# Patient Record
Sex: FEMALE | Race: WHITE | NOT HISPANIC OR LATINO
[De-identification: names, ages, dates, MRNs, and addresses within clinical notes are randomized per-mention and may not be internally consistent; named-entity substitution may affect disease eponyms.]

---

## 2019-02-08 ENCOUNTER — RESULT REVIEW (OUTPATIENT)
Age: 24
End: 2019-02-08

## 2019-02-09 ENCOUNTER — TRANSCRIPTION ENCOUNTER (OUTPATIENT)
Age: 24
End: 2019-02-09

## 2019-10-01 ENCOUNTER — TRANSCRIPTION ENCOUNTER (OUTPATIENT)
Age: 24
End: 2019-10-01

## 2019-10-15 ENCOUNTER — TRANSCRIPTION ENCOUNTER (OUTPATIENT)
Age: 24
End: 2019-10-15

## 2019-10-23 ENCOUNTER — TRANSCRIPTION ENCOUNTER (OUTPATIENT)
Age: 24
End: 2019-10-23

## 2019-10-24 ENCOUNTER — NON-APPOINTMENT (OUTPATIENT)
Age: 24
End: 2019-10-24

## 2019-10-24 ENCOUNTER — APPOINTMENT (OUTPATIENT)
Dept: INTERNAL MEDICINE | Facility: CLINIC | Age: 24
End: 2019-10-24
Payer: COMMERCIAL

## 2019-10-24 VITALS
RESPIRATION RATE: 16 BRPM | BODY MASS INDEX: 23.46 KG/M2 | HEIGHT: 66 IN | HEART RATE: 84 BPM | SYSTOLIC BLOOD PRESSURE: 110 MMHG | DIASTOLIC BLOOD PRESSURE: 76 MMHG | WEIGHT: 146 LBS | OXYGEN SATURATION: 98 %

## 2019-10-24 DIAGNOSIS — J45.901 UNSPECIFIED ASTHMA WITH (ACUTE) EXACERBATION: ICD-10-CM

## 2019-10-24 DIAGNOSIS — Z00.00 ENCOUNTER FOR GENERAL ADULT MEDICAL EXAMINATION W/OUT ABNORMAL FINDINGS: ICD-10-CM

## 2019-10-24 LAB
BILIRUB UR QL STRIP: NEGATIVE
CLARITY UR: CLEAR
COLLECTION METHOD: NORMAL
GLUCOSE UR-MCNC: NEGATIVE
HCG UR QL: 0.2 EU/DL
HGB UR QL STRIP.AUTO: NEGATIVE
KETONES UR-MCNC: NEGATIVE
LEUKOCYTE ESTERASE UR QL STRIP: NEGATIVE
NITRITE UR QL STRIP: NEGATIVE
PH UR STRIP: 7
PROT UR STRIP-MCNC: NEGATIVE
SP GR UR STRIP: 1.01

## 2019-10-24 PROCEDURE — 94010 BREATHING CAPACITY TEST: CPT

## 2019-10-24 PROCEDURE — 81003 URINALYSIS AUTO W/O SCOPE: CPT | Mod: QW

## 2019-10-24 PROCEDURE — 36415 COLL VENOUS BLD VENIPUNCTURE: CPT

## 2019-10-24 PROCEDURE — 99385 PREV VISIT NEW AGE 18-39: CPT | Mod: 25

## 2019-10-24 PROCEDURE — 93000 ELECTROCARDIOGRAM COMPLETE: CPT

## 2019-10-24 RX ORDER — ALBUTEROL SULFATE 90 UG/1
108 (90 BASE) AEROSOL, METERED RESPIRATORY (INHALATION)
Refills: 0 | Status: ACTIVE | COMMUNITY
Start: 2019-10-24

## 2019-10-24 RX ORDER — ALBUTEROL SULFATE 2.5 MG/3ML
(2.5 MG/3ML) SOLUTION RESPIRATORY (INHALATION)
Refills: 0 | Status: ACTIVE | COMMUNITY
Start: 2019-10-24

## 2019-10-24 NOTE — HEALTH RISK ASSESSMENT
[Excellent] : ~his/her~  mood as  excellent [0] : 2) Feeling down, depressed, or hopeless: Not at all (0) [With Family] : lives with family [Employed] : employed [College] : College [Sexually Active] : sexually active [de-identified] : appropriate [Change in mental status noted] : No change in mental status noted [High Risk Behavior] : no high risk behavior [Reports changes in hearing] : Reports no changes in hearing [Reports changes in vision] : Reports no changes in vision [de-identified] : JULIENNE Marie  [FreeTextEntry3] : BF x >3 y   ([employer] [facility])

## 2019-10-24 NOTE — HISTORY OF PRESENT ILLNESS
[FreeTextEntry1] : Initial visit\par New physician wanted [de-identified] : Recently   urgent care\par Asthma flare   present 10 y   \par    [facility]    [place]\par Several visit\par Cough\par SOB\par \par Recent 5 d   steroids by mouth   tapered\par Wheeeze\par No fever\par Mucous\par \par Recent PPD (neg) \par Recent Flu shot \par CXR reported neg\par BDtherapy / inhaled steroid / beta 2 blocker / montelukast

## 2019-10-24 NOTE — ASSESSMENT
[FreeTextEntry1] : Asthma\par Stay on current meds\par \par Short OR\par Prob unimportant\par No syncope\par No real worrisome symptoims thou she has had some mild dizziness\par \par Call for BT reports\par \par

## 2019-10-24 NOTE — PHYSICAL EXAM
[No Acute Distress] : no acute distress [Well Nourished] : well nourished [Well Developed] : well developed [Well-Appearing] : well-appearing [Normal Sclera/Conjunctiva] : normal sclera/conjunctiva [PERRL] : pupils equal round and reactive to light [EOMI] : extraocular movements intact [Normal Outer Ear/Nose] : the outer ears and nose were normal in appearance [Normal Oropharynx] : the oropharynx was normal [No JVD] : no jugular venous distention [No Lymphadenopathy] : no lymphadenopathy [Supple] : supple [Thyroid Normal, No Nodules] : the thyroid was normal and there were no nodules present [No Respiratory Distress] : no respiratory distress  [Diffuse Wheezing] : diffuse wheezing was heard [Normal Rate] : normal rate  [Regular Rhythm] : with a regular rhythm [Normal S1, S2] : normal S1 and S2 [No Murmur] : no murmur heard [No Abdominal Bruit] : a ~M bruit was not heard ~T in the abdomen [No Carotid Bruits] : no carotid bruits [No Varicosities] : no varicosities [Pedal Pulses Present] : the pedal pulses are present [No Edema] : there was no peripheral edema [No Palpable Aorta] : no palpable aorta [No Extremity Clubbing/Cyanosis] : no extremity clubbing/cyanosis [Non Tender] : non-tender [Soft] : abdomen soft [Non-distended] : non-distended [No Masses] : no abdominal mass palpated [No HSM] : no HSM [Normal Bowel Sounds] : normal bowel sounds [Normal Posterior Cervical Nodes] : no posterior cervical lymphadenopathy [Normal Anterior Cervical Nodes] : no anterior cervical lymphadenopathy [No Spinal Tenderness] : no spinal tenderness [No CVA Tenderness] : no CVA  tenderness [No Joint Swelling] : no joint swelling [Grossly Normal Strength/Tone] : grossly normal strength/tone [No Rash] : no rash [Coordination Grossly Intact] : coordination grossly intact [No Focal Deficits] : no focal deficits [Normal Gait] : normal gait [Deep Tendon Reflexes (DTR)] : deep tendon reflexes were 2+ and symmetric [Normal Affect] : the affect was normal [Normal Insight/Judgement] : insight and judgment were intact

## 2019-10-25 LAB
25(OH)D3 SERPL-MCNC: 18.6 NG/ML
ALBUMIN SERPL ELPH-MCNC: 4.8 G/DL
ALP BLD-CCNC: 62 U/L
ALT SERPL-CCNC: 18 U/L
ANION GAP SERPL CALC-SCNC: 13 MMOL/L
AST SERPL-CCNC: 21 U/L
BASOPHILS # BLD AUTO: 0.03 K/UL
BASOPHILS NFR BLD AUTO: 0.8 %
BILIRUB SERPL-MCNC: 0.5 MG/DL
BUN SERPL-MCNC: 12 MG/DL
CALCIUM SERPL-MCNC: 9.7 MG/DL
CHLORIDE SERPL-SCNC: 102 MMOL/L
CHOLEST SERPL-MCNC: 213 MG/DL
CHOLEST/HDLC SERPL: 2.9 RATIO
CO2 SERPL-SCNC: 26 MMOL/L
CREAT SERPL-MCNC: 0.88 MG/DL
EOSINOPHIL # BLD AUTO: 0.11 K/UL
EOSINOPHIL NFR BLD AUTO: 2.8 %
ESTIMATED AVERAGE GLUCOSE: 88 MG/DL
GLUCOSE SERPL-MCNC: 76 MG/DL
HBA1C MFR BLD HPLC: 4.7 %
HCT VFR BLD CALC: 43.9 %
HDLC SERPL-MCNC: 73 MG/DL
HGB BLD-MCNC: 14.3 G/DL
IMM GRANULOCYTES NFR BLD AUTO: 0.8 %
LDLC SERPL CALC-MCNC: 111 MG/DL
LYMPHOCYTES # BLD AUTO: 1.15 K/UL
LYMPHOCYTES NFR BLD AUTO: 29.3 %
MAN DIFF?: NORMAL
MCHC RBC-ENTMCNC: 32.3 PG
MCHC RBC-ENTMCNC: 32.6 GM/DL
MCV RBC AUTO: 99.1 FL
MONOCYTES # BLD AUTO: 0.31 K/UL
MONOCYTES NFR BLD AUTO: 7.9 %
NEUTROPHILS # BLD AUTO: 2.29 K/UL
NEUTROPHILS NFR BLD AUTO: 58.4 %
PLATELET # BLD AUTO: 201 K/UL
POTASSIUM SERPL-SCNC: 4.1 MMOL/L
PROT SERPL-MCNC: 6.8 G/DL
RBC # BLD: 4.43 M/UL
RBC # FLD: 12.6 %
SODIUM SERPL-SCNC: 141 MMOL/L
T4 FREE SERPL-MCNC: 1.1 NG/DL
TRIGL SERPL-MCNC: 143 MG/DL
TSH SERPL-ACNC: 1.76 UIU/ML
WBC # FLD AUTO: 3.92 K/UL

## 2019-11-07 ENCOUNTER — TRANSCRIPTION ENCOUNTER (OUTPATIENT)
Age: 24
End: 2019-11-07

## 2019-12-19 ENCOUNTER — APPOINTMENT (OUTPATIENT)
Dept: OPHTHALMOLOGY | Facility: CLINIC | Age: 24
End: 2019-12-19
Payer: COMMERCIAL

## 2019-12-19 ENCOUNTER — NON-APPOINTMENT (OUTPATIENT)
Age: 24
End: 2019-12-19

## 2019-12-19 PROCEDURE — 92004 COMPRE OPH EXAM NEW PT 1/>: CPT

## 2019-12-30 ENCOUNTER — TRANSCRIPTION ENCOUNTER (OUTPATIENT)
Age: 24
End: 2019-12-30

## 2020-04-26 ENCOUNTER — MESSAGE (OUTPATIENT)
Age: 25
End: 2020-04-26

## 2022-09-20 ENCOUNTER — APPOINTMENT (OUTPATIENT)
Dept: ORTHOPEDIC SURGERY | Facility: CLINIC | Age: 27
End: 2022-09-20

## 2023-05-10 ENCOUNTER — APPOINTMENT (OUTPATIENT)
Dept: ANTEPARTUM | Facility: CLINIC | Age: 28
End: 2023-05-10
Payer: COMMERCIAL

## 2023-05-10 ENCOUNTER — APPOINTMENT (OUTPATIENT)
Dept: OBGYN | Facility: CLINIC | Age: 28
End: 2023-05-10
Payer: COMMERCIAL

## 2023-05-10 VITALS — SYSTOLIC BLOOD PRESSURE: 128 MMHG | DIASTOLIC BLOOD PRESSURE: 81 MMHG | WEIGHT: 150 LBS

## 2023-05-10 DIAGNOSIS — Z36.82 ENCOUNTER FOR ANTENATAL SCREENING FOR NUCHAL TRANSLUCENCY: ICD-10-CM

## 2023-05-10 PROCEDURE — 76813 OB US NUCHAL MEAS 1 GEST: CPT

## 2023-05-10 PROCEDURE — 36415 COLL VENOUS BLD VENIPUNCTURE: CPT

## 2023-05-10 PROCEDURE — 76801 OB US < 14 WKS SINGLE FETUS: CPT | Mod: 59

## 2023-05-10 PROCEDURE — 99203 OFFICE O/P NEW LOW 30 MIN: CPT

## 2023-05-10 NOTE — HISTORY OF PRESENT ILLNESS
[FreeTextEntry1] : Patient is a 28 year old presenting at 11w5d seen in my office today for nuchal translucency testing. DOM 11/24/23.The limitations of testing were discussed with the patient and she was informed that this is a screening test. If she desires a diagnostic test like CVS or Amniocentesis, this may be performed.

## 2023-05-10 NOTE — DISCUSSION/SUMMARY
[FreeTextEntry1] : The significance of nuchal translucency testing was explained to the patient and ultrasound was performed. The sensitivity of the test can be improved by combining with second trimester quad screening. This type of sequential testing minimally increases the false positive rate, but the detection rate for Down syndrome is increased. If the sequential testing is desired, a second stage quad should be drawn and sent. As an alternative, this can be done in our office. If the patient does not desire sequential testing, then a single marker for AFP may be sent to any lab after 15 completed weeks gestation.\par \par Prenatal diagnostic testing is clinically indicated for this patient. The limitations of NIPT testing were discussed with the patient and amniocentesis was noted to remain the gold standard for prenatal diagnostic testing. The significance of NIPT testing was reviewed and offered to the patient and she has agreed to testing. Blood was drawn and the results will be sent to your office in approximately 2 weeks. Sequential screening is recommended between 15-16 weeks. Anatomy scan is recommended at 19-20 weeks.

## 2023-05-15 LAB
ADDITIONAL US: NORMAL
CRL SCAN TWIN B: NORMAL
CRL SCAN: NORMAL
CROWN RUMP LENGTH TWIN B: NORMAL
CROWN RUMP LENGTH: 51.6 MM
DIA MOM: 1.52
DIA VALUE: 395.6 PG/ML
DOWN SYNDROME AGE RISK: NORMAL
DOWN SYNDROME INTERPRETATION: NORMAL
DOWN SYNDROME SCREENING RISK: NORMAL
FIRST TRIMESTER SCREEN COMMENTS: NORMAL
FIRST TRIMESTER SCREEN NOTE: NORMAL
FIRST TRIMESTER SCREEN RESULTS: NORMAL
FIRST TRIMESTER SCREEN TEST RESULTS: NORMAL
GEST. AGE ON COLLECTION DATE: 11.7 WEEKS
HCG MOM: 1.11
HCG VALUE: 123.9 IU/ML
MATERNAL AGE AT EDD: 27.8 YR
NT MOM TWIN B: NORMAL
NT TWIN B: NORMAL
NUCHAL TRANSLUCENCY (NT): 1.1 MM
NUCHAL TRANSLUCENCY MOM: 0.81
NUMBER OF FETUSES: 1
PAPP-A MOM: 1.42
PAPP-A VALUE: 1059.1 NG/ML
RACE: NORMAL
SONOGRAPHER ID#: NORMAL
TRISOMY 18 AGE RISK: NORMAL
TRISOMY 18 INTERPRETATION: NORMAL
TRISOMY 18 SCREENING RISK: NORMAL
WEIGHT AFP: 150 LBS

## 2023-06-13 DIAGNOSIS — Z80.3 FAMILY HISTORY OF MALIGNANT NEOPLASM OF BREAST: ICD-10-CM

## 2023-06-13 DIAGNOSIS — Z3A.16 16 WEEKS GESTATION OF PREGNANCY: ICD-10-CM

## 2023-06-13 DIAGNOSIS — R55 SYNCOPE AND COLLAPSE: ICD-10-CM

## 2023-06-13 DIAGNOSIS — Z82.49 FAMILY HISTORY OF ISCHEMIC HEART DISEASE AND OTHER DISEASES OF THE CIRCULATORY SYSTEM: ICD-10-CM

## 2023-06-13 DIAGNOSIS — Z78.9 OTHER SPECIFIED HEALTH STATUS: ICD-10-CM

## 2023-06-13 RX ORDER — PRENATAL VIT NO.130/IRON/FOLIC 27MG-0.8MG
TABLET ORAL
Refills: 0 | Status: ACTIVE | COMMUNITY

## 2023-06-13 RX ORDER — MONTELUKAST SODIUM 10 MG/1
10 TABLET, FILM COATED ORAL
Refills: 0 | Status: DISCONTINUED | COMMUNITY
Start: 2019-10-24 | End: 2023-06-13

## 2023-06-13 RX ORDER — FLUTICASONE PROPIONATE 110 UG/1
110 AEROSOL, METERED RESPIRATORY (INHALATION)
Refills: 0 | Status: DISCONTINUED | COMMUNITY
Start: 2019-10-24 | End: 2023-06-13

## 2023-06-15 ENCOUNTER — APPOINTMENT (OUTPATIENT)
Dept: CARDIOLOGY | Facility: CLINIC | Age: 28
End: 2023-06-15
Payer: COMMERCIAL

## 2023-06-15 ENCOUNTER — NON-APPOINTMENT (OUTPATIENT)
Age: 28
End: 2023-06-15

## 2023-06-15 VITALS
HEIGHT: 66 IN | HEART RATE: 76 BPM | WEIGHT: 152 LBS | SYSTOLIC BLOOD PRESSURE: 120 MMHG | DIASTOLIC BLOOD PRESSURE: 74 MMHG | BODY MASS INDEX: 24.43 KG/M2 | OXYGEN SATURATION: 100 %

## 2023-06-15 DIAGNOSIS — R06.02 SHORTNESS OF BREATH: ICD-10-CM

## 2023-06-15 DIAGNOSIS — R00.2 PALPITATIONS: ICD-10-CM

## 2023-06-15 PROCEDURE — 93000 ELECTROCARDIOGRAM COMPLETE: CPT

## 2023-06-15 PROCEDURE — 99203 OFFICE O/P NEW LOW 30 MIN: CPT | Mod: 25

## 2023-06-15 NOTE — HISTORY OF PRESENT ILLNESS
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 She feels her heart racing; gets flushed and lightheaded, few seconds where she is disoriented.\par Saw outside cardiologist and is currently wearing monitor for 2 weeks\par Echo done at [facility] was normal\par

## 2023-06-15 NOTE — DISCUSSION/SUMMARY
[FreeTextEntry1] : 28 year old  who is 16 weeks pregnant  and comes in for evaluation of palpitations\par -will get monitor results from Dr. Mistry\par -fu thereafter [EKG obtained to assist in diagnosis and management of assessed problem(s)] : EKG obtained to assist in diagnosis and management of assessed problem(s)

## 2023-07-05 ENCOUNTER — APPOINTMENT (OUTPATIENT)
Dept: OBGYN | Facility: CLINIC | Age: 28
End: 2023-07-05
Payer: COMMERCIAL

## 2023-07-05 ENCOUNTER — APPOINTMENT (OUTPATIENT)
Dept: ANTEPARTUM | Facility: CLINIC | Age: 28
End: 2023-07-05
Payer: COMMERCIAL

## 2023-07-05 VITALS — BODY MASS INDEX: 25.82 KG/M2 | WEIGHT: 160 LBS | SYSTOLIC BLOOD PRESSURE: 109 MMHG | DIASTOLIC BLOOD PRESSURE: 70 MMHG

## 2023-07-05 DIAGNOSIS — Z3A.26 26 WEEKS GESTATION OF PREGNANCY: ICD-10-CM

## 2023-07-05 DIAGNOSIS — Z3A.20 20 WEEKS GESTATION OF PREGNANCY: ICD-10-CM

## 2023-07-05 PROCEDURE — ZZZZZ: CPT

## 2023-07-05 PROCEDURE — 76805 OB US >/= 14 WKS SNGL FETUS: CPT

## 2023-08-01 ENCOUNTER — APPOINTMENT (OUTPATIENT)
Dept: CARDIOLOGY | Facility: CLINIC | Age: 28
End: 2023-08-01

## 2023-08-15 ENCOUNTER — OUTPATIENT (OUTPATIENT)
Dept: INPATIENT UNIT | Facility: HOSPITAL | Age: 28
LOS: 1 days | Discharge: HOME | End: 2023-08-15
Payer: COMMERCIAL

## 2023-08-15 ENCOUNTER — APPOINTMENT (OUTPATIENT)
Dept: ANTEPARTUM | Facility: CLINIC | Age: 28
End: 2023-08-15

## 2023-08-15 VITALS
SYSTOLIC BLOOD PRESSURE: 116 MMHG | DIASTOLIC BLOOD PRESSURE: 63 MMHG | HEART RATE: 94 BPM | RESPIRATION RATE: 16 BRPM | TEMPERATURE: 98 F

## 2023-08-15 VITALS — SYSTOLIC BLOOD PRESSURE: 105 MMHG | HEART RATE: 79 BPM | DIASTOLIC BLOOD PRESSURE: 64 MMHG

## 2023-08-15 DIAGNOSIS — O26.899 OTHER SPECIFIED PREGNANCY RELATED CONDITIONS, UNSPECIFIED TRIMESTER: ICD-10-CM

## 2023-08-15 PROBLEM — Z00.00 ENCOUNTER FOR PREVENTIVE HEALTH EXAMINATION: Status: ACTIVE | Noted: 2023-08-15

## 2023-08-15 LAB
APPEARANCE UR: CLEAR — SIGNIFICANT CHANGE UP
BILIRUB UR-MCNC: NEGATIVE — SIGNIFICANT CHANGE UP
BLD GP AB SCN SERPL QL: NEGATIVE — SIGNIFICANT CHANGE UP
COLOR SPEC: YELLOW — SIGNIFICANT CHANGE UP
DIFF PNL FLD: NEGATIVE — SIGNIFICANT CHANGE UP
GLUCOSE UR QL: NEGATIVE MG/DL — SIGNIFICANT CHANGE UP
KETONES UR-MCNC: NEGATIVE MG/DL — SIGNIFICANT CHANGE UP
LEUKOCYTE ESTERASE UR-ACNC: NEGATIVE — SIGNIFICANT CHANGE UP
NITRITE UR-MCNC: NEGATIVE — SIGNIFICANT CHANGE UP
PH UR: 7.5 — SIGNIFICANT CHANGE UP (ref 5–8)
PROT UR-MCNC: NEGATIVE MG/DL — SIGNIFICANT CHANGE UP
RH IG SCN BLD-IMP: POSITIVE — SIGNIFICANT CHANGE UP
SP GR SPEC: 1.01 — SIGNIFICANT CHANGE UP (ref 1–1.03)
UROBILINOGEN FLD QL: 0.2 MG/DL — SIGNIFICANT CHANGE UP (ref 0.2–1)

## 2023-08-15 PROCEDURE — 59025 FETAL NON-STRESS TEST: CPT | Mod: 26

## 2023-08-15 PROCEDURE — 99221 1ST HOSP IP/OBS SF/LOW 40: CPT | Mod: 25

## 2023-08-15 NOTE — OB RN TRIAGE NOTE - CURRENT PREGNANCY COMPLICATIONS, OB PROFILE
Gestational Age less than 36 Weeks 1st trimester syncope X2, cardiology work-uo wnl/Gestational Age less than 36 Weeks

## 2023-08-15 NOTE — OB PROVIDER TRIAGE NOTE - ADDITIONAL INSTRUCTIONS
Please drink 1-2 liters of fluid per day. Please increase your fiber intake to help with constipation. Please go to your next scheduled prenatal appointment.

## 2023-08-15 NOTE — OB PROVIDER TRIAGE NOTE - NSOBPROVIDERNOTE_OBGYN_ALL_OB_FT
a/p: 28 y.o.  DOM 2023 @ 25.4 weeks vaginal spotting, cramping    ua pending  blood type B positive    Minh NP a/p: 28 y.o.  DOM 2023 @ 25.4 weeks vaginal spotting, cramping    ua pending  blood type B positive    0900  UA negative  no vaginal bleeding, no evidence of PTL, NST reactive, no ctx noted  no evidence of acute process at this time  discussed with Dr Del Castillo. Plan for discharge home.    labor precautions/fetal kick counts reviewed. Encouraged increased PO hydration. F/U next scheduled prenatal appointment.  Encouraged increased fiber intake for constipation.    Minh, NP a/p: 28 y.o.  DOM 2023 @ 25.4 weeks vaginal spotting, cramping    ua pending  blood type B positive    0900  UA negative  no vaginal bleeding, no evidence of PTL, NST reactive, no ctx noted  no evidence of acute process at this time  discussed with Dr Del Castillo. Plan for discharge home.    labor precautions/fetal kick counts reviewed. Encouraged increased PO hydration. F/U next scheduled prenatal appointment.  Encouraged increased fiber intake for constipation.  Pt discharged 915a    Minh, NP

## 2023-08-15 NOTE — OB PROVIDER TRIAGE NOTE - NSHPLABSRESULTS_GEN_ALL_CORE
Urinalysis Basic - ( 15 Aug 2023 07:35 )    Color: Yellow / Appearance: Clear / S.007 / pH: x  Gluc: x / Ketone: Negative mg/dL  / Bili: Negative / Urobili: 0.2 mg/dL   Blood: x / Protein: Negative mg/dL / Nitrite: Negative   Leuk Esterase: Negative / RBC: x / WBC x   Sq Epi: x / Non Sq Epi: x / Bacteria: x

## 2023-08-15 NOTE — OB PROVIDER TRIAGE NOTE - NSHPPHYSICALEXAM_GEN_ALL_CORE
Vital Signs Last 24 Hrs  T(C): 36.8 (08-15-23 @ 06:46), Max: 36.8 (08-15-23 @ 06:46)  T(F): 98.2 (08-15-23 @ 06:46), Max: 98.2 (08-15-23 @ 06:46)  HR: 79 (08-15-23 @ 08:18) (74 - 94)  BP: 105/64 (08-15-23 @ 08:18) (105/64 - 116/63)  BP(mean): --  RR: 16 (08-15-23 @ 06:46) (16 - 16)  SpO2: --    abdomen soft, nontender  taus: sliup, cephalic presentation, posterior placenta, +FM, MVP 3.4, m mode  bpm, images saved in ASOB  sse: cervix appears closed, no blood noted in vaginal vault, small leukorrhea noted, FFN collected and held  tvus: cervical length 5.1, no dynamic changes/funneling noted  nst reactive  toco: no ctx noted

## 2023-08-15 NOTE — OB PROVIDER TRIAGE NOTE - HISTORY OF PRESENT ILLNESS
28 y.o.  DOM 2023 @ 25.4 weeks presents with c/o lower abdominal cramping, pt unsure of interval which began last night 8pm, 5/10 pain and vaginal spotting upon wiping at 530am. Pt denies LOF, dysuria, sexual intercourse in past 24 hours, fever, chills, nausea, vomiting. States +FM and BM this morning. Pt c/o constipation. Pt denies antepartum complications. States first episode spotting this pregnancy.

## 2023-08-17 DIAGNOSIS — O99.612 DISEASES OF THE DIGESTIVE SYSTEM COMPLICATING PREGNANCY, SECOND TRIMESTER: ICD-10-CM

## 2023-08-17 DIAGNOSIS — O26.892 OTHER SPECIFIED PREGNANCY RELATED CONDITIONS, SECOND TRIMESTER: ICD-10-CM

## 2023-08-17 DIAGNOSIS — O26.852 SPOTTING COMPLICATING PREGNANCY, SECOND TRIMESTER: ICD-10-CM

## 2023-08-17 DIAGNOSIS — K59.00 CONSTIPATION, UNSPECIFIED: ICD-10-CM

## 2023-08-17 DIAGNOSIS — O99.512 DISEASES OF THE RESPIRATORY SYSTEM COMPLICATING PREGNANCY, SECOND TRIMESTER: ICD-10-CM

## 2023-08-17 DIAGNOSIS — J45.909 UNSPECIFIED ASTHMA, UNCOMPLICATED: ICD-10-CM

## 2023-08-17 DIAGNOSIS — R10.30 LOWER ABDOMINAL PAIN, UNSPECIFIED: ICD-10-CM

## 2023-08-17 DIAGNOSIS — Z3A.25 25 WEEKS GESTATION OF PREGNANCY: ICD-10-CM

## 2023-08-22 ENCOUNTER — APPOINTMENT (OUTPATIENT)
Dept: ANTEPARTUM | Facility: CLINIC | Age: 28
End: 2023-08-22
Payer: COMMERCIAL

## 2023-08-22 ENCOUNTER — APPOINTMENT (OUTPATIENT)
Dept: OBGYN | Facility: CLINIC | Age: 28
End: 2023-08-22
Payer: COMMERCIAL

## 2023-08-22 ENCOUNTER — APPOINTMENT (OUTPATIENT)
Dept: OBGYN | Facility: CLINIC | Age: 28
End: 2023-08-22

## 2023-08-22 ENCOUNTER — APPOINTMENT (OUTPATIENT)
Dept: ANTEPARTUM | Facility: CLINIC | Age: 28
End: 2023-08-22

## 2023-08-22 VITALS
HEIGHT: 66 IN | DIASTOLIC BLOOD PRESSURE: 79 MMHG | SYSTOLIC BLOOD PRESSURE: 129 MMHG | WEIGHT: 168 LBS | BODY MASS INDEX: 27 KG/M2

## 2023-08-22 PROBLEM — Z3A.26 26 WEEKS GESTATION OF PREGNANCY: Status: ACTIVE | Noted: 2023-08-22

## 2023-08-22 PROCEDURE — ZZZZZ: CPT

## 2023-08-22 PROCEDURE — 76819 FETAL BIOPHYS PROFIL W/O NST: CPT | Mod: 59

## 2023-08-22 PROCEDURE — 76816 OB US FOLLOW-UP PER FETUS: CPT

## 2023-08-22 NOTE — OB SUMMARY
[Date: _____] : Date: [unfilled]  [Gestational Age: _____] : Gestational Age: [unfilled]  [FreeTextEntry1] : pt presents to office for anatomy scan. anatomy scan done today. No gross abnormalities noted. Normal growth. Normal fluid. Normal movement. +FHR (see official sono report)\par -f/u PRN  [de-identified] : dn  [FreeTextEntry9] : pt presents to office today for EFW. SGA - Wt 1lbs 14oz. AC at 10th%tile. Normal fluid. Normal movement. +FHR (see official sono report). No fetal arrythmia seen  -RTO 4 weeks for EFW  [de-identified] : desirae

## 2023-09-01 ENCOUNTER — TRANSCRIPTION ENCOUNTER (OUTPATIENT)
Age: 28
End: 2023-09-01

## 2023-09-01 ENCOUNTER — OUTPATIENT (OUTPATIENT)
Dept: OUTPATIENT SERVICES | Facility: HOSPITAL | Age: 28
LOS: 1 days | Discharge: HOME | End: 2023-09-01
Payer: COMMERCIAL

## 2023-09-01 ENCOUNTER — EMERGENCY (EMERGENCY)
Facility: HOSPITAL | Age: 28
LOS: 1 days | Discharge: HOME | End: 2023-09-01
Admitting: STUDENT IN AN ORGANIZED HEALTH CARE EDUCATION/TRAINING PROGRAM
Payer: COMMERCIAL

## 2023-09-01 ENCOUNTER — APPOINTMENT (OUTPATIENT)
Dept: ANTEPARTUM | Facility: CLINIC | Age: 28
End: 2023-09-01
Payer: COMMERCIAL

## 2023-09-01 ENCOUNTER — ASOB RESULT (OUTPATIENT)
Age: 28
End: 2023-09-01

## 2023-09-01 VITALS
RESPIRATION RATE: 18 BRPM | TEMPERATURE: 98 F | SYSTOLIC BLOOD PRESSURE: 121 MMHG | HEART RATE: 85 BPM | OXYGEN SATURATION: 100 % | DIASTOLIC BLOOD PRESSURE: 79 MMHG

## 2023-09-01 VITALS — HEART RATE: 98 BPM | OXYGEN SATURATION: 100 % | OXYGEN SATURATION: 100 %

## 2023-09-01 VITALS
DIASTOLIC BLOOD PRESSURE: 61 MMHG | HEART RATE: 79 BPM | TEMPERATURE: 98 F | RESPIRATION RATE: 17 BRPM | SYSTOLIC BLOOD PRESSURE: 142 MMHG

## 2023-09-01 DIAGNOSIS — O26.899 OTHER SPECIFIED PREGNANCY RELATED CONDITIONS, UNSPECIFIED TRIMESTER: ICD-10-CM

## 2023-09-01 LAB
BASOPHILS # BLD AUTO: 0.04 K/UL — SIGNIFICANT CHANGE UP (ref 0–0.2)
BASOPHILS NFR BLD AUTO: 0.5 % — SIGNIFICANT CHANGE UP (ref 0–2)
EOSINOPHIL # BLD AUTO: 0.34 K/UL — SIGNIFICANT CHANGE UP (ref 0–0.5)
EOSINOPHIL NFR BLD AUTO: 4.6 % — SIGNIFICANT CHANGE UP (ref 0–6)
FIBRINOGEN PPP-MCNC: 325 MG/DL — SIGNIFICANT CHANGE UP (ref 200–465)
HCT VFR BLD CALC: 33.6 % — LOW (ref 34.5–45)
HGB BLD-MCNC: 11.8 G/DL — SIGNIFICANT CHANGE UP (ref 11.5–15.5)
IANC: 5 K/UL — SIGNIFICANT CHANGE UP (ref 1.8–7.4)
IMM GRANULOCYTES NFR BLD AUTO: 1.5 % — HIGH (ref 0–0.9)
LYMPHOCYTES # BLD AUTO: 1.5 K/UL — SIGNIFICANT CHANGE UP (ref 1–3.3)
LYMPHOCYTES # BLD AUTO: 20.1 % — SIGNIFICANT CHANGE UP (ref 13–44)
MCHC RBC-ENTMCNC: 33 PG — SIGNIFICANT CHANGE UP (ref 27–34)
MCHC RBC-ENTMCNC: 35.1 GM/DL — SIGNIFICANT CHANGE UP (ref 32–36)
MCV RBC AUTO: 93.9 FL — SIGNIFICANT CHANGE UP (ref 80–100)
MONOCYTES # BLD AUTO: 0.46 K/UL — SIGNIFICANT CHANGE UP (ref 0–0.9)
MONOCYTES NFR BLD AUTO: 6.2 % — SIGNIFICANT CHANGE UP (ref 2–14)
NEUTROPHILS # BLD AUTO: 5 K/UL — SIGNIFICANT CHANGE UP (ref 1.8–7.4)
NEUTROPHILS NFR BLD AUTO: 67.1 % — SIGNIFICANT CHANGE UP (ref 43–77)
NRBC # BLD: 0 /100 WBCS — SIGNIFICANT CHANGE UP (ref 0–0)
NRBC # FLD: 0 K/UL — SIGNIFICANT CHANGE UP (ref 0–0)
PLATELET # BLD AUTO: 161 K/UL — SIGNIFICANT CHANGE UP (ref 150–400)
RBC # BLD: 3.58 M/UL — LOW (ref 3.8–5.2)
RBC # FLD: 12.4 % — SIGNIFICANT CHANGE UP (ref 10.3–14.5)
WBC # BLD: 7.45 K/UL — SIGNIFICANT CHANGE UP (ref 3.8–10.5)
WBC # FLD AUTO: 7.45 K/UL — SIGNIFICANT CHANGE UP (ref 3.8–10.5)

## 2023-09-01 PROCEDURE — 99284 EMERGENCY DEPT VISIT MOD MDM: CPT | Mod: GC

## 2023-09-01 PROCEDURE — 76815 OB US LIMITED FETUS(S): CPT | Mod: 26

## 2023-09-01 PROCEDURE — 99283 EMERGENCY DEPT VISIT LOW MDM: CPT

## 2023-09-01 PROCEDURE — 76819 FETAL BIOPHYS PROFIL W/O NST: CPT | Mod: 26

## 2023-09-01 NOTE — OB RN TRIAGE NOTE - BIRTH SEX
Abdomen soft, non-tender and non-distended without organomegaly or masses. Normal bowel sounds.
Female

## 2023-09-01 NOTE — OB PROVIDER TRIAGE NOTE - HISTORY OF PRESENT ILLNESS
29yo female P0 @ 28.0 wks SLIUP uncomp PNC here  29[facility] Pt last night hit her head placing items into her arm. Pt reports having HA's and ear ringing, dizziness and nausea afterwards that prompted her to come to the ED at 4am. Pt was diagnosed with a traumatic brain injury and discharged home with precautions. Pt called her ob concerned that an ob evaluation was performed. Pt was instructed to come in. Pt reports GFM and denies VB/LOF/ ctx's. Pt still reporting a ringing in the ears and a HA that is unrelieved by Tylenol.    AP course uncomplicated

## 2023-09-01 NOTE — ED ADULT TRIAGE NOTE - CHIEF COMPLAINT QUOTE
Pt c/o headache, b/l ear pain states yesterday she came up too fast and hit head on car. Denies LOC, dizziness, nausea, or vomiting. Pt 28 wks pregnant. DOM 11/24. PMHx asthma

## 2023-09-01 NOTE — ED PROVIDER NOTE - NSFOLLOWUPINSTRUCTIONS_ED_ALL_ED_FT
A concussion is a mild brain injury. It is usually caused by a bump or blow to the head from a fall, a motor vehicle crash, or a sports injury. Being forcefully shaken may cause a concussion.    Symptoms may happen right away, or they may develop days after the concussion:    Headache    Dizziness, loss of balance, or blurry vision    Nausea or vomiting    A change in mood, such as restlessness or irritability    Trouble thinking, remembering things, or concentrating    Ringing in the ears    Drowsiness or decreased energy    Changes in your normal sleeping pattern  Call your local emergency number (911 in the ), or have someone call if:    You cannot be woken.    You have a seizure, increasing confusion, or a change in personality.    Your speech becomes slurred.  Seek care immediately if:    You have sudden or new vision problems.    One of your pupils is bigger than the other.    You have a severe headache that does not go away.    You have arm or leg weakness, numbness, or new problems with coordination.    You have blood or clear fluid coming out of the ears or nose.    You cannot stop vomiting.  Call your doctor if:    You have nausea or are vomiting.    You feel more sleepy than usual.    Your symptoms get worse.    Your symptoms last longer than 6 weeks.    You have questions or concerns about your condition or care.  Manage a concussion: Usually no treatment is needed for a mild concussion. Concussion symptoms usually go away within 10 days, but they may last longer. The following may be recommended to manage your symptoms:    Rest from physical and mental activities as directed. Mental activities are those that require thinking, concentration, and attention. You will need to rest until your symptoms are gone. Rest will allow you to recover from your concussion. Ask your healthcare provider when you can return to work and other daily activities.    Have someone stay with you for the first 24 hours after your injury. Your healthcare provider should be contacted if your symptoms get worse, or you develop new symptoms.    Do not participate in sports and physical activities until your healthcare provider says it is okay. These can make your symptoms worse or lead to another concussion. Your healthcare provider will tell you when it is okay for you to return to sports or physical activities. Ask for more information about sports concussions.    Do not use heavy machinery or drive for 24 hours after your injury, or as directed. This can be dangerous and cause a serious accident. Your healthcare provider will tell you when it is safe for you to return to these activities.    Pain medicine may help relieve headache pain. Do not use NSAIDs or aspirin. These can increase your risk for bleeding. Your provider may recommend acetaminophen. Ask how much to take and how often to take it. Follow directions. Read the labels of all other medicines you are using to see if they also contain acetaminophen, or ask your doctor or pharmacist. Acetaminophen can cause liver damage if not taken correctly.  Prevent another concussion:    Wear protective sports equipment that fits properly. Helmets help lower your risk for a serious brain injury. Talk to your healthcare provider about ways you can decrease your risk for a concussion if you play sports.    Wear your seatbelt every time you travel. This helps lower your risk for a head injury if you are in a car accident.  Follow up with your doctor as directed: Write down your questions so you remember to ask them during your visits.    For more information:    [employer]  [address] [place], [place]  Phone: [phone]  Phone: [phone]  Web Address: [email or web address]  © [employer] 1973, 2023

## 2023-09-01 NOTE — CONSULT NOTE ADULT - ASSESSMENT
28y (1995) woman who is 28 weeks pregnant presenting with headache since yesterday. She reports getting items our of her car and she hit her head. Afterwards, she was having bifrontal headache. She felt nauseous. Headache started out as 3/10 then slowly came up to 6/10 after several hours. She took 2 tyelnol with minimal relief. Denies positional headache, weakness, numbness, changes to speech. Exam nonfocal.     Impression:    Recommendation:   28y (1995) woman who is 28 weeks pregnant presenting with headache since yesterday. She reports getting items our of her car and she hit her head. Afterwards, she was having bifrontal headache. She felt nauseous. Headache started out as 3/10 then slowly came up to 6/10 after several hours. She took 2 tyelnol with minimal relief. Denies positional headache, weakness, numbness, changes to speech. Exam nonfocal.     Impression: Headaches with photophobia, nausea likely post-concussion headache     Recommendation:  INCOMPLETE  [] First line: recommend migraine medications (to be given all together at the same time): acetaminophen 1g IV q8h PRN, metoclopramide (Reglan) 10mg q8h PRN, diphenhydramine (Benadryl) 25mg IV q8h PRN. Please repeat at least 2-3 cycles for medications to be effective  [] IV hydration, Mg 2g IV x1    28y (1995) woman who is 28 weeks pregnant presenting with headache since yesterday. She reports getting items our of her car and she hit her head. Afterwards, she was having bifrontal headache. She felt nauseous. Headache started out as 3/10 then slowly came up to 6/10 after several hours. She took 2 tyelnol with minimal relief. Denies positional headache, weakness, numbness, changes to speech. Exam nonfocal.     Impression: Headaches with photophobia, nausea likely post-concussion headache     Recommendation:   [] First line: recommend migraine medications (to be given all together at the same time): acetaminophen 1g IV q8h PRN, metoclopramide (Reglan) 10mg q8h PRN, diphenhydramine (Benadryl) 25mg IV q8h PRN. Please repeat at least 2-3 cycles for medications to be effective  [] IV hydration, Mg 2g IV x1  [] Follow up Neurology as outpatient at [address] [place] [address] neurology [phone]  [] No neurological contraindications for discharge     Seen and discussed with Dr. Catalan.

## 2023-09-01 NOTE — CONSULT NOTE ADULT - ATTENDING COMMENTS
I interviewed the patient, discussed the case with the Resident and agree with the findings and plan as documented in the Resident's note except below.  During my assessment, no focal deficit on my exam. Patient reported headache is better and 3/10. Etiology of headache is uncertain, most likely due to the postconcussion.  Continue medical management, fall precaution,  neuro- check.  GI and DVT prophylaxis.  I discussed the diagnosis and treatment plan with the patient. All questions and concerns were addressed. The patient demonstrated good understanding of the treatment plan.  If you have any further questions, please do not hesitate to contact our team.  Thank you for allowing us to participate in this patient care.

## 2023-09-01 NOTE — ED PROVIDER NOTE - OBJECTIVE STATEMENT
patient is a 29 y/o F, 28 wks pregnant, pmhx of presenting with c/o HA, and b/l ear pain after hitting her head on the ceiling of her car, 1 day prior. She reports while attempting to get out of her vehicle she  raised up too quickly and hitting her head on ceiling. at time of incident patient reports being asymptomatic,  there was no LOC. as time progress she developed HA and ear pain. patient denies use of blood thinning medication, visual disturbance, n/v, numbness, weakness, slurred speech, abdominal/pelvic pain, vaginal bleeding or any other acute obstetric complaint. patient has OB appt later today.

## 2023-09-01 NOTE — ED ADULT NURSE NOTE - OBJECTIVE STATEMENT
Patient received in ED intake room 4. A&Ox4 and ambulatory. C/o head pain after standing up too fast out of car, hitting head yesterday. Also endorsing bilateral ear pain states yesterday she came up too fast and hit head on car. Denies CP, SOB, nausea, vomiting, LOC, dizziness, lightheadedness, vision changes, nausea, or vomiting. Pt 28 wks pregnant. Respirations even and unlabored. No acute distress noted. Speaking in full and complete sentences. Bed in lowest position, call bell in reach, wheels locked, safety maintained. Awaiting further orders.

## 2023-09-01 NOTE — OB PROVIDER TRIAGE NOTE - NSOBPROVIDERNOTE_OBGYN_ALL_OB_FT
29yo female P0 @ 28.0 wks SLIUP uncomp PNC here for ob clearance s/p head trauma  -pt dx with traumatic brain injury  -NST and BPP are 10/10  -pt was dw Dr Kang-Russ  -abruption orders requested and neuro consult requested

## 2023-09-01 NOTE — ED ADULT NURSE NOTE - CAS DISCH CONDITION
Lizbeth Macias is a 89 year old female presenting with consultation for dyspnes on exertion.   Chest x ray done 5/26/2017  Body mass index is 19.69 kg/m².  Pulse ox 95%  Pt referred by Dr. 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  Has patient had pneumonia shot? complete   Number of pillows needed for sleep: 1   PCP Karen May MD        Immunization History   Administered Date(s) Administered   • INFLUENZA QUADRIVALENT 09/21/2015   • Influenza 10/05/1987, 10/25/1988, 11/10/1989, 11/06/1991, 11/03/1992, 10/20/1993, 10/20/2004, 10/01/2006, 09/16/2009, 10/03/2010, 09/15/2011, 10/05/2012, 10/14/2013, 09/28/2016   • Influenza A novel H1N1 11/01/2009   • Pneumococcal Conjugate 13 Valent 06/05/2015   • Pneumococcal Polysaccharide Adult 01/01/1985, 11/01/1997, 02/02/2004   • Td:Adult type tetanus/diphtheria 11/06/1991, 02/02/2004   • Zoster Shingles 07/28/2014         Pharmacy verified with pt.   Pt has LATEX allergy/sensitivity  Medications verified, no changes.  History   Smoking Status   • Former Smoker   • Packs/day: 0.25   • Years: 5.00   • Types: Cigarettes   • Quit date: 1/1/1966   Smokeless Tobacco   • Never Used     Comment: Was a minimal smoker in remote past        Stable

## 2023-09-01 NOTE — ED ADULT NURSE NOTE - NSFALLUNIVINTERV_ED_ALL_ED
Bed/Stretcher in lowest position, wheels locked, appropriate side rails in place/Call bell, personal items and telephone in reach/Instruct patient to call for assistance before getting out of bed/chair/stretcher/Non-slip footwear applied when patient is off stretcher/[place] to call system/Physically safe environment - no spills, clutter or unnecessary equipment/Purposeful proactive rounding/Room/bathroom lighting operational, light cord in reach

## 2023-09-01 NOTE — OB PROVIDER TRIAGE NOTE - NSHPLABSRESULTS_GEN_ALL_CORE
Blood type: B+ Blood type: B+    Fibrinogen- 325               11.8  7.45>------------<161              33.6

## 2023-09-01 NOTE — ED PROVIDER NOTE - ENMT, MLM
Airway patent, Nasal mucosa clear. Mouth with normal mucosa. Throat has no vesicles, no oropharyngeal exudates and uvula is midline. EAR: Tympanic membrane intact b/l, no pain with auricle manipulation, canal edema

## 2023-09-01 NOTE — OB PROVIDER TRIAGE NOTE - NSHPPHYSICALEXAM_GEN_ALL_CORE
ICU Vital Signs Last 24 Hrs  T(C): 36.7 (01 Sep 2023 13:55), Max: 36.7 (01 Sep 2023 13:55)  T(F): 98.1 (01 Sep 2023 13:55), Max: 98.1 (01 Sep 2023 13:55)  HR: 90 (01 Sep 2023 14:31) (79 - 90)  BP: 118/60 (01 Sep 2023 14:31) (118/60 - 142/61)  BP(mean): --  ABP: --  ABP(mean): --  RR: 17 (01 Sep 2023 13:55) (17 - 18)  SpO2: 100% (01 Sep 2023 04:32) (100% - 100%)    Gen: A&O x 3; NAD    Neuro- symmetrical facial features; no slurring of words  Pulm- breathing is unlabored  Abd exam- soft and nontender  Extremities- full range of motion    NST reactive cat I ICU Vital Signs Last 24 Hrs  T(C): 36.7 (01 Sep 2023 13:55), Max: 36.7 (01 Sep 2023 13:55)  T(F): 98.1 (01 Sep 2023 13:55), Max: 98.1 (01 Sep 2023 13:55)  HR: 90 (01 Sep 2023 14:31) (79 - 90)  BP: 118/60 (01 Sep 2023 14:31) (118/60 - 142/61)  BP(mean): --  ABP: --  ABP(mean): --  RR: 17 (01 Sep 2023 13:55) (17 - 18)  SpO2: 100% (01 Sep 2023 04:32) (100% - 100%)    Gen: A&O x 3; NAD    Neuro- symmetrical facial features; no slurring of words  Pulm- breathing is unlabored  Abd exam- soft and nontender  Extremities- full range of motion    NST reactive with 140 baseline with accels and mod variability; irritability on toco    Abd sono- Images and report in ASOB- vtx; posterior placenta; MARCIAL-16.33; 8/8 BPP

## 2023-09-01 NOTE — OB RN TRIAGE NOTE - FALL HARM RISK - UNIVERSAL INTERVENTIONS
Bed in lowest position, wheels locked, appropriate side rails in place/Call bell, personal items and telephone in reach/Instruct patient to call for assistance before getting out of bed or chair/Non-slip footwear when patient is out of bed/[place] to call system/Physically safe environment - no spills, clutter or unnecessary equipment/Purposeful Proactive Rounding/Room/bathroom lighting operational, light cord in reach

## 2023-09-01 NOTE — CONSULT NOTE ADULT - SUBJECTIVE AND OBJECTIVE BOX
Neurology - Consult Note    -  Spectra: [phone] ([facility]), [phone] ([facility])  -    HPI: Patient AURELIANO ALVAREZ is a 28y (1995) woman who is 28 weeks pregnant presenting with headache since yesterday. She reports getting items our of her car and she hit her       Review of Systems:    All other review of systems is negative unless indicated above.    Allergies:  No Known Allergies      PMHx/PSHx/Family Hx: As above, otherwise see below   Asthma        Social Hx:  No current use of tobacco, alcohol, or illicit drugs  Lives with ***    Medications:  MEDICATIONS  (STANDING):    MEDICATIONS  (PRN):      Vitals:  T(C): 36.7 (09-01-23 @ 13:55), Max: 36.7 (09-01-23 @ 13:55)  HR: 90 (09-01-23 @ 14:31) (79 - 90)  BP: 118/60 (09-01-23 @ 14:31) (118/60 - 142/61)  RR: 17 (09-01-23 @ 13:55) (17 - 18)  SpO2: 100% (09-01-23 @ 04:32) (100% - 100%)    Physical Examination: INCOMPLETE  General - NAD  Cardiovascular - Peripheral pulses palpable, no edema  Eyes - Fundoscopy with flat, sharp optic discs and no hemorrhage or exudates; Fundoscopy not well visualized; Fundoscopy not performed due to safety precautions in the setting of the COVID-19 pandemic    Neurologic Exam:  Mental status - Awake, Alert, Oriented to person, place, and time. Speech fluent, repetition and naming intact. Follows simple and complex commands. Attention/concentration, recent and remote memory (including registration and recall), and fund of knowledge intact    Cranial nerves - PERRLA, VFF, EOMI, face sensation (V1-V3) intact b/l, facial strength intact without asymmetry b/l, hearing intact b/l, palate with symmetric elevation, trapezius OR sternocleidomastiod 5/5 strength b/l, tongue midline on protrusion with full lateral movement    Motor - Normal bulk and tone throughout. No pronator drift.  Strength testing            Deltoid      Biceps      Triceps     Wrist Extension    Wrist Flexion     Interossei         R            5                 5               5                     5                              5                        5                 5  L             5                 5               5                     5                              5                        5                 5              Hip Flexion    Hip Extension    Knee Flexion    Knee Extension    Dorsiflexion    Plantar Flexion  R              5                           5                       5                           5                            5                          5  L              5                           5                        5                           5                            5                          5    Sensation - Light touch/temperature OR pain/vibration intact throughout    DTR's -             Biceps      Triceps     Brachioradialis      Patellar    Ankle    Toes/plantar response  R             2+             2+                  2+                       2+            2+                 Down  L              2+             2+                 2+                        2+           2+                 Down    Coordination - Finger to Nose intact b/l. No tremors appreciated    Gait and station - Normal casual gait. Romberg (-)    Labs:          CAPILLARY BLOOD GLUCOSE        Radiology:     Neurology - Consult Note    -  Spectra: [phone] ([facility]), [phone] ([facility])  -    HPI: Patient AURELIANO ALVAREZ is a 28y (1995) woman who is 28 weeks pregnant presenting with headache since yesterday. She reports getting items our of her car and she hit her head. Afterwards, she was having bifrontal headache. She felt nauseous. Headache started out as 3/10 then slowly came up to 6/10 after several hours. She took 2 tyelnol with minimal relief. Denies positional headache, weakness, numbness, changes to speech.      Review of Systems:    All other review of systems is negative unless indicated above.    Allergies:  No Known Allergies      PMHx/PSHx/Family Hx: As above, otherwise see below   Asthma        Social Hx:  No current use of tobacco, alcohol, or illicit drugs    Medications:  MEDICATIONS  (STANDING):    MEDICATIONS  (PRN):      Vitals:  T(C): 36.7 (09-01-23 @ 13:55), Max: 36.7 (09-01-23 @ 13:55)  HR: 90 (09-01-23 @ 14:31) (79 - 90)  BP: 118/60 (09-01-23 @ 14:31) (118/60 - 142/61)  RR: 17 (09-01-23 @ 13:55) (17 - 18)  SpO2: 100% (09-01-23 @ 04:32) (100% - 100%)    Physical Examination:   General - NAD      Neurologic Exam:  Mental status - Awake, Alert, Oriented to person, place, and time. Speech fluent, repetition and naming intact. Follows simple and complex commands    Cranial nerves - PERRL, VFF, EOMI, face sensation (V1-V3) intact b/l, facial strength intact without asymmetry b/l, hearing intact b/l, palate with symmetric elevation, trapezius 5/5 strength b/l, tongue midline on protrusion with full lateral movement    Motor - Normal bulk and tone throughout. No pronator drift.  Strength testing            Deltoid      Biceps      Triceps     Wrist Extension    Wrist Flexion         R            5                 5               5                     5                              5                        5                   L             5                 5               5                     5                              5                        5                               Hip Flexion    Hip Extension    Knee Flexion    Knee Extension    Dorsiflexion    Plantar Flexion  R              5                           5                       5                           5                            5                          5  L              5                           5                        5                           5                            5                          5    Sensation - Light touch/temperature intact throughout    DTR's -             Biceps      Triceps     Brachioradialis      Patellar    Ankle    Toes/plantar response  R             2+             2+                  2+                       2+            2+                 Down  L              2+             2+                 2+                        2+           2+                 Down    Coordination - Finger to Nose and heel to shin intact b/l. No tremors appreciated    Gait and station -Deffered     Labs:          CAPILLARY BLOOD GLUCOSE        Radiology:

## 2023-09-01 NOTE — ED PROVIDER NOTE - CLINICAL SUMMARY MEDICAL DECISION MAKING FREE TEXT BOX
patient is a 29 y/o F, currently 28 wks pregnant presenting for evaluation of HA and b/l ear pain after sustaining minor head traum w/o LOC 1 day prior. On presentation patient is well appearing, vitals stable, no acute obstetric complaints. neurological exam unremarkable, ear exam demonstrating TM intact b/l or acute signs of infection. symptoms likely due to concussive-like symptoms. patient reassured and the low probability of ICH, skull fracture, and  no clinical indication for CT scan discussed. patient verbalized comprehension. patient would like be discharge. she will follow up with her OB later today.

## 2023-09-01 NOTE — ED PROVIDER NOTE - PATIENT PORTAL LINK FT
You can access the FollowMyHealth Patient Portal offered by [facility] by registering at the following website: [email or web address]. By joining [employer]’s FollowMyHealth portal, you will also be able to view your health information using other applications (apps) compatible with our system.
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
## 2023-09-04 DIAGNOSIS — R51.9 HEADACHE, UNSPECIFIED: ICD-10-CM

## 2023-09-04 DIAGNOSIS — H93.19 TINNITUS, UNSPECIFIED EAR: ICD-10-CM

## 2023-09-04 DIAGNOSIS — R11.0 NAUSEA: ICD-10-CM

## 2023-09-04 DIAGNOSIS — D64.9 ANEMIA, UNSPECIFIED: ICD-10-CM

## 2023-09-04 DIAGNOSIS — O99.013 ANEMIA COMPLICATING PREGNANCY, THIRD TRIMESTER: ICD-10-CM

## 2023-09-04 DIAGNOSIS — O26.893 OTHER SPECIFIED PREGNANCY RELATED CONDITIONS, THIRD TRIMESTER: ICD-10-CM

## 2023-09-04 DIAGNOSIS — S09.90XA UNSPECIFIED INJURY OF HEAD, INITIAL ENCOUNTER: ICD-10-CM

## 2023-09-04 DIAGNOSIS — R42 DIZZINESS AND GIDDINESS: ICD-10-CM

## 2023-09-04 DIAGNOSIS — O09.213 SUPERVISION OF PREGNANCY WITH HISTORY OF PRE-TERM LABOR, THIRD TRIMESTER: ICD-10-CM

## 2023-09-04 DIAGNOSIS — Z3A.28 28 WEEKS GESTATION OF PREGNANCY: ICD-10-CM

## 2023-09-04 DIAGNOSIS — O99.891 OTHER SPECIFIED DISEASES AND CONDITIONS COMPLICATING PREGNANCY: ICD-10-CM
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
## 2023-09-18 ENCOUNTER — APPOINTMENT (OUTPATIENT)
Dept: ANTEPARTUM | Facility: CLINIC | Age: 28
End: 2023-09-18
Payer: COMMERCIAL

## 2023-09-18 ENCOUNTER — APPOINTMENT (OUTPATIENT)
Dept: OBGYN | Facility: CLINIC | Age: 28
End: 2023-09-18
Payer: COMMERCIAL

## 2023-09-18 ENCOUNTER — APPOINTMENT (OUTPATIENT)
Dept: ANTEPARTUM | Facility: CLINIC | Age: 28
End: 2023-09-18

## 2023-09-18 ENCOUNTER — NON-APPOINTMENT (OUTPATIENT)
Age: 28
End: 2023-09-18

## 2023-09-18 ENCOUNTER — ASOB RESULT (OUTPATIENT)
Age: 28
End: 2023-09-18

## 2023-09-18 ENCOUNTER — EMERGENCY (EMERGENCY)
Facility: HOSPITAL | Age: 28
LOS: 1 days | Discharge: HOME | End: 2023-09-18
Attending: EMERGENCY MEDICINE | Admitting: EMERGENCY MEDICINE
Payer: COMMERCIAL

## 2023-09-18 ENCOUNTER — OUTPATIENT (OUTPATIENT)
Dept: INPATIENT UNIT | Facility: HOSPITAL | Age: 28
LOS: 1 days | Discharge: HOME | End: 2023-09-18
Payer: COMMERCIAL

## 2023-09-18 VITALS
DIASTOLIC BLOOD PRESSURE: 74 MMHG | OXYGEN SATURATION: 100 % | RESPIRATION RATE: 18 BRPM | TEMPERATURE: 98 F | HEART RATE: 88 BPM | SYSTOLIC BLOOD PRESSURE: 131 MMHG

## 2023-09-18 VITALS
TEMPERATURE: 98 F | SYSTOLIC BLOOD PRESSURE: 128 MMHG | RESPIRATION RATE: 17 BRPM | DIASTOLIC BLOOD PRESSURE: 70 MMHG | HEART RATE: 86 BPM

## 2023-09-18 VITALS — SYSTOLIC BLOOD PRESSURE: 124 MMHG | DIASTOLIC BLOOD PRESSURE: 72 MMHG | HEART RATE: 86 BPM

## 2023-09-18 DIAGNOSIS — O26.899 OTHER SPECIFIED PREGNANCY RELATED CONDITIONS, UNSPECIFIED TRIMESTER: ICD-10-CM

## 2023-09-18 PROCEDURE — ZZZZZ: CPT

## 2023-09-18 PROCEDURE — 99221 1ST HOSP IP/OBS SF/LOW 40: CPT | Mod: 25

## 2023-09-18 PROCEDURE — 99284 EMERGENCY DEPT VISIT MOD MDM: CPT

## 2023-09-18 PROCEDURE — 76816 OB US FOLLOW-UP PER FETUS: CPT

## 2023-09-18 PROCEDURE — 59025 FETAL NON-STRESS TEST: CPT | Mod: 26

## 2023-09-18 PROCEDURE — 76819 FETAL BIOPHYS PROFIL W/O NST: CPT | Mod: 59

## 2023-09-18 PROCEDURE — 93010 ELECTROCARDIOGRAM REPORT: CPT

## 2023-09-18 NOTE — ED ADULT TRIAGE NOTE - CHIEF COMPLAINT QUOTE
pt 30 weeks prgnant, c/o generalized weakness and "I feel like im going to pass out".  pt was just d/c'd from L&D for decreased fetal movement, on her way to the car she felt like she was going to pass out.  Hx:  asthma

## 2023-09-18 NOTE — OB PROVIDER TRIAGE NOTE - HISTORY OF PRESENT ILLNESS
29yo  @ 30.3 presents with c/o decreased fetal movement since this morning. Denies LOF, VB, ctx.  Has growth scan in Dr. Landeros's office this afternoon.     H/O Asthma  Anxiety

## 2023-09-18 NOTE — OB PROVIDER TRIAGE NOTE - NSOBPROVIDERNOTE_OBGYN_ALL_OB_FT
Plan D/W Dr. Jimenez, no evidence of acute process at this time.   Normal fetal testing.   Follow up at appointment tonight  Fetal kick counts.

## 2023-09-18 NOTE — ED PROVIDER NOTE - OBJECTIVE STATEMENT
Devante CHRISTIANSEN: Patient is a 28-year-old female with a history of asthma, , 30 weeks and 3 days pregnant with confirmed IUP here for episode of near syncope.  Patient states she felt she had decreased fetal movement today and went to [facility].  She was assessed there and states baby was "moving like crazy" when evaluated at L&D.  Patient states that she was discharged and was going to her car, walking up the stairs when she felt near syncopal.  She states that she experienced palpitations.  She reports that she has had palpitations earlier in the pregnancy and has passed out in the past.  She reports she last had a bagel and coffee around 6:30 AM today.  She also had a cupcake a few hours ago.  She denies any chest pain, shortness of breath, leg swelling, calf pain.  She denies any sore throat, cough, vomiting. She denies any urinary symptoms, vaginal bleeding.  She states that she did have loose stool today but she thinks it is nerves.  Patient is present with her mother who she called.  Patient states that she was also in a panic when this occurred in the parking garage.  Patient has an appointment this afternoon with her OB/GYN doctor.

## 2023-09-18 NOTE — ED PROVIDER NOTE - PATIENT PORTAL LINK FT
You can access the FollowMyHealth Patient Portal offered by [facility] by registering at the following website: [email or web address]. By joining [employer]’s FollowMyHealth portal, you will also be able to view your health information using other applications (apps) compatible with our system.

## 2023-09-18 NOTE — ED PROVIDER NOTE - CARE PROVIDER_API CALL
Vicki Saxena  Cardiology  [address] [place] [address], [address] [place] [id number]  Phone: [phone]  Fax: [phone]  Follow Up Time:

## 2023-09-18 NOTE — ED PROVIDER NOTE - NSFOLLOWUPINSTRUCTIONS_ED_ALL_ED_FT
You were seen in the emergency department for evaluation of  palpitations and episode of nearly passing out.  You had an EKG done that showed a sinus arrhythmia with short TX interval.  You indicated that you were previously worked up by cardiologist and had a Holter monitor placed and are aware of the short TX interval.  At the time of our assessment, you had no palpitations, no chest pain no abdominal pain, no vaginal bleeding.  You indicated that you went to L&D and had your baby checked and that there was normal fetal movement.  You have plans to follow-up with your OB/GYN doctor this afternoon.  Please follow-up as planned.    Please also discuss these continued palpitations with your cardiologist.  At this time, you indicated that you did not want any blood work done.   If you feel you are developing new symptoms, have persistent symptoms, pass out, develop pain in your concern, please return back to the emergency department.

## 2023-09-18 NOTE — ED PROVIDER NOTE - CLINICAL SUMMARY MEDICAL DECISION MAKING FREE TEXT BOX
Devante CHRISTIANSEN:   28-year-old female, 30 weeks pregnant, here for evaluation of near syncopal episode.  EKG reviewed and it shows a sinus rhythm with sinus arrhythmia.  No prior EKGs are available.  Patient states episode lasted a few minutes.  She went to  L&D today secondary to concern for decreased fetal movement and had an evaluation.  Per patient she had normal fetal movement and was told to follow-up as planned this afternoon with her doctor.  Patient denies any infectious symptoms such as fever, cough, urinary symptoms.  She denies any abdominal or pelvic pain.  No vaginal bleeding.  On exam, there are no focal findings.  Discussed checking blood work and giving fluids with patient at this time, she feels that this may not be necessary. Devante CHRISTIANSEN:   28-year-old female, 30 weeks pregnant, here for evaluation of near syncopal episode.  EKG reviewed and it shows a sinus rhythm with sinus arrhythmia.  No prior EKGs are available.  Patient states episode lasted a few minutes.  She went to  L&D today secondary to concern for decreased fetal movement and had an evaluation.  Per patient she had normal fetal movement and was told to follow-up as planned this afternoon with her doctor.  Patient denies any infectious symptoms such as fever, cough, urinary symptoms.  She denies any abdominal or pelvic pain.  No vaginal bleeding.  On exam, there are no focal findings.  Discussed checking blood work and giving fluids with patient. At this time, she feels that this may not be necessary. Patient states that she had a cardiac work-up at [place].  She also followed up with Dr. Saxena.  She states that her mother has WPW and she was evaluated for this and she was told she does not have it.  She reports that she had a Holter monitor earlier in her pregnancy which was normal.  Patient states that she is aware of the short AR.  EKG reviewed with patient.  She took a picture of it with plans to discuss with her cardiologist.  At this time she has no complaints.  Orthostatics were done and are negative.  Patient states that she feels much better and does not want any blood work or fluids at this time.  She states that she feels that she was hungry.  She ate a rice crispy treat and was given ginger ale.  She plans to follow-up with her OB/GYN this afternoon as planned.

## 2023-09-18 NOTE — OB PROVIDER TRIAGE NOTE - NSHPPHYSICALEXAM_GEN_ALL_CORE
Assessment reveals VSS, abdomen soft, NT, gravid.   Cat 1 FHT, no ctx on toco  BPP 8/8, MARCIAL 17.5, vtx, posterior placenta-saved in asob    Reports feeling GFM at this time.

## 2023-09-20 DIAGNOSIS — J45.909 UNSPECIFIED ASTHMA, UNCOMPLICATED: ICD-10-CM

## 2023-09-20 DIAGNOSIS — F41.9 ANXIETY DISORDER, UNSPECIFIED: ICD-10-CM

## 2023-09-20 DIAGNOSIS — O99.513 DISEASES OF THE RESPIRATORY SYSTEM COMPLICATING PREGNANCY, THIRD TRIMESTER: ICD-10-CM

## 2023-09-20 DIAGNOSIS — Z3A.30 30 WEEKS GESTATION OF PREGNANCY: ICD-10-CM

## 2023-09-20 DIAGNOSIS — O36.8130 DECREASED FETAL MOVEMENTS, THIRD TRIMESTER, NOT APPLICABLE OR UNSPECIFIED: ICD-10-CM

## 2023-09-20 DIAGNOSIS — O99.343 OTHER MENTAL DISORDERS COMPLICATING PREGNANCY, THIRD TRIMESTER: ICD-10-CM

## 2023-10-10 ENCOUNTER — OUTPATIENT (OUTPATIENT)
Dept: INPATIENT UNIT | Facility: HOSPITAL | Age: 28
LOS: 1 days | Discharge: HOME | End: 2023-10-10
Payer: COMMERCIAL

## 2023-10-10 ENCOUNTER — ASOB RESULT (OUTPATIENT)
Age: 28
End: 2023-10-10

## 2023-10-10 ENCOUNTER — APPOINTMENT (OUTPATIENT)
Dept: ANTEPARTUM | Facility: CLINIC | Age: 28
End: 2023-10-10
Payer: COMMERCIAL

## 2023-10-10 VITALS
SYSTOLIC BLOOD PRESSURE: 120 MMHG | RESPIRATION RATE: 17 BRPM | DIASTOLIC BLOOD PRESSURE: 68 MMHG | HEART RATE: 83 BPM | TEMPERATURE: 98 F

## 2023-10-10 VITALS — DIASTOLIC BLOOD PRESSURE: 72 MMHG | SYSTOLIC BLOOD PRESSURE: 117 MMHG | HEART RATE: 93 BPM

## 2023-10-10 DIAGNOSIS — O26.899 OTHER SPECIFIED PREGNANCY RELATED CONDITIONS, UNSPECIFIED TRIMESTER: ICD-10-CM

## 2023-10-10 PROBLEM — J45.909 UNSPECIFIED ASTHMA, UNCOMPLICATED: Chronic | Status: ACTIVE | Noted: 2023-08-15

## 2023-10-10 LAB
APPEARANCE UR: CLEAR — SIGNIFICANT CHANGE UP
BILIRUB UR-MCNC: NEGATIVE — SIGNIFICANT CHANGE UP
COLOR SPEC: YELLOW — SIGNIFICANT CHANGE UP
DIFF PNL FLD: NEGATIVE — SIGNIFICANT CHANGE UP
GLUCOSE UR QL: NEGATIVE MG/DL — SIGNIFICANT CHANGE UP
KETONES UR-MCNC: NEGATIVE MG/DL — SIGNIFICANT CHANGE UP
LEUKOCYTE ESTERASE UR-ACNC: NEGATIVE — SIGNIFICANT CHANGE UP
NITRITE UR-MCNC: NEGATIVE — SIGNIFICANT CHANGE UP
PH UR: 7 — SIGNIFICANT CHANGE UP (ref 5–8)
PROT UR-MCNC: NEGATIVE MG/DL — SIGNIFICANT CHANGE UP
SP GR SPEC: 1.01 — SIGNIFICANT CHANGE UP (ref 1–1.03)
UROBILINOGEN FLD QL: 0.2 MG/DL — SIGNIFICANT CHANGE UP (ref 0.2–1)

## 2023-10-10 PROCEDURE — 76815 OB US LIMITED FETUS(S): CPT | Mod: 26

## 2023-10-10 PROCEDURE — 99221 1ST HOSP IP/OBS SF/LOW 40: CPT | Mod: 25

## 2023-10-10 PROCEDURE — 76819 FETAL BIOPHYS PROFIL W/O NST: CPT | Mod: 26

## 2023-10-10 PROCEDURE — 76817 TRANSVAGINAL US OBSTETRIC: CPT | Mod: 26

## 2023-10-10 PROCEDURE — 59025 FETAL NON-STRESS TEST: CPT | Mod: 26

## 2023-10-10 NOTE — OB PROVIDER TRIAGE NOTE - NSHPLABSRESULTS_GEN_ALL_CORE
Urinalysis Basic - ( 10 Oct 2023 12:50 )    Color: Yellow / Appearance: Clear / S.006 / pH: x  Gluc: x / Ketone: Negative mg/dL  / Bili: Negative / Urobili: 0.2 mg/dL   Blood: x / Protein: Negative mg/dL / Nitrite: Negative   Leuk Esterase: Negative / RBC: x / WBC x   Sq Epi: x / Non Sq Epi: x / Bacteria: x

## 2023-10-10 NOTE — OB RN TRIAGE NOTE - FALL HARM RISK - UNIVERSAL INTERVENTIONS
Bed in lowest position, wheels locked, appropriate side rails in place/Call bell, personal items and telephone in reach/Instruct patient to call for assistance before getting out of bed or chair/Non-slip footwear when patient is out of bed/[place] to call system/Physically safe environment - no spills, clutter or unnecessary equipment/Purposeful Proactive Rounding/Room/bathroom lighting operational, light cord in reach You can access the FollowMyHealth Patient Portal offered by [facility] by registering at the following website: [email or web address]. By joining [employer]’s FollowMyHealth portal, you will also be able to view your health information using other applications (apps) compatible with our system.

## 2023-10-10 NOTE — OB PROVIDER TRIAGE NOTE - HISTORY OF PRESENT ILLNESS
28 y.o  @ 33w4d presents to hospital with c/o cramping and back pain, and decreased fetal movement since yesterday. Patient reports having infrequent cramping, 5/10 on pain scale, but states is tolerable. She had her baby shower this past , states that she was standing/walking for most of the day and is not sure if that may be the cause of her discomfort. She reports having +BM this morning. Denies vaginal bleeding, leaking of fluids, sexual intercourse, SOB, chest pain, palpitations, recent infection/illness.    Prenatal Course:  -Syncopal episodes in 1st trimester--Sees Cardiologist Dr. Vicki Saxena ([facility]). S/p EKG, echo, holter monitoring. 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 Patient reports having infrequent cramping, 5/10 on pain scale, but states is tolerable. She had her baby shower this past , states that she was standing/walking for most of the day and is not sure if that may be the cause of her discomfort. She reports having +BM this morning. Denies vaginal bleeding, leaking of fluids, sexual intercourse, SOB, chest pain, palpitations, recent infection/illness.    Prenatal Course:  -Syncopal episodes in 1st trimester--Sees Cardiologist Dr. Vciki Saxena ([facility]). S/p EKG, echo, holter monitoring with previous cardiologist (Dr. Haroon Mistry of [place]), all wnl. 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  -Asthma--uses inhaler 2-3x/week, no intubations/hospitalizations

## 2023-10-10 NOTE — OB PROVIDER TRIAGE NOTE - NSICDXPASTMEDICALHX_GEN_ALL_CORE_FT
PAST MEDICAL HISTORY:  Asthma     Kidney stones     Tyson Parkinson White pattern seen on electrocardiogram

## 2023-10-10 NOTE — OB PROVIDER TRIAGE NOTE - NSHPPHYSICALEXAM_GEN_ALL_CORE
T(C): 36.4 (10-10-23 @ 12:39), Max: 36.4 (10-10-23 @ 12:25)  HR: 93 (10-10-23 @ 14:01) (79 - 93)  BP: 117/72 (10-10-23 @ 14:01) (111/66 - 126/70)  RR: 17 (10-10-23 @ 12:39) (17 - 17)    Physical Exam  Gen: NAD  Pulm: Unlabored  Abdomen: soft, nontender, gravid    TAUS: cephalic, posterior placenta, MARCIAL 12.95, BPP 8/8,  bpm via m-mode, images saved in ASOB  TVUS: CL 2.93cm, no dynamic changes  SSE: cervix appears closed, scant white vaginal discharge noted  EFM: 140 bpm, moderate variability, +accels, no decels, reactive NST  [place]: uterine irritability, no contractions noted

## 2023-10-10 NOTE — OB RN TRIAGE NOTE - NSICDXPASTMEDICALHX_GEN_ALL_CORE_FT
PAST MEDICAL HISTORY:  Asthma      PAST MEDICAL HISTORY:  Asthma     Kidney stones     Tyson Parkinson White pattern seen on electrocardiogram

## 2023-10-10 NOTE — OB PROVIDER TRIAGE NOTE - ADDITIONAL INSTRUCTIONS
Follow up with Dr. Ponce at your next scheduled prenatal appointment on 10/17/23. Continue to eat a regular diet and drink plenty of fluid. Return to hospital if you experience cramping, contractions, leaking of vaginal fluid, vaginal bleeding, or a decrease/absence of your baby's movements.

## 2023-10-10 NOTE — OB PROVIDER TRIAGE NOTE - CURRENT PREGNANCY COMPLICATIONS, OB PROFILE
syncopal episodes with pregnancy>kang parkinson syndrome, no tx>for pp followup/Gestational Age less than 36 Weeks/Maternal Unknown GBS/Other

## 2023-10-10 NOTE — OB PROVIDER TRIAGE NOTE - NSOBPROVIDERNOTE_OBGYN_ALL_OB_FT
28 y.o  @ 33w4d presenting for r/o PTL.    -Fetal status reassuring  -Cervical length wnl  -Uterine irritability noted with no contractions seen on toco  -Urinalysis ordered and negative   -Patient now feeling increase in fetal movement and reports decrease in cramping  -Discussed with Dr. Del Castillo. Plan for discharge to home with follow up at next prenatal appointment with Dr. Kang-Russ on 10/17/23.  -Written and verbal discharge instructions provided to patient. Instructed to return to hospital if experiencing cramping, contractions, leaking of vaginal fluid, vaginal bleeding, or a decrease/absence of fetal movement.

## 2023-10-12 ENCOUNTER — APPOINTMENT (OUTPATIENT)
Dept: OBGYN | Facility: CLINIC | Age: 28
End: 2023-10-12
Payer: COMMERCIAL

## 2023-10-12 ENCOUNTER — ASOB RESULT (OUTPATIENT)
Age: 28
End: 2023-10-12

## 2023-10-12 ENCOUNTER — APPOINTMENT (OUTPATIENT)
Dept: ANTEPARTUM | Facility: CLINIC | Age: 28
End: 2023-10-12
Payer: COMMERCIAL

## 2023-10-12 ENCOUNTER — OUTPATIENT (OUTPATIENT)
Dept: INPATIENT UNIT | Facility: HOSPITAL | Age: 28
LOS: 1 days | Discharge: HOME | End: 2023-10-12
Payer: COMMERCIAL

## 2023-10-12 VITALS
SYSTOLIC BLOOD PRESSURE: 134 MMHG | DIASTOLIC BLOOD PRESSURE: 77 MMHG | HEART RATE: 92 BPM | RESPIRATION RATE: 15 BRPM | TEMPERATURE: 98 F

## 2023-10-12 VITALS — DIASTOLIC BLOOD PRESSURE: 79 MMHG | SYSTOLIC BLOOD PRESSURE: 134 MMHG

## 2023-10-12 VITALS — HEART RATE: 81 BPM | SYSTOLIC BLOOD PRESSURE: 110 MMHG | DIASTOLIC BLOOD PRESSURE: 56 MMHG

## 2023-10-12 DIAGNOSIS — J45.909 UNSPECIFIED ASTHMA, UNCOMPLICATED: ICD-10-CM

## 2023-10-12 DIAGNOSIS — O26.893 OTHER SPECIFIED PREGNANCY RELATED CONDITIONS, THIRD TRIMESTER: ICD-10-CM

## 2023-10-12 DIAGNOSIS — O99.413 DISEASES OF THE CIRCULATORY SYSTEM COMPLICATING PREGNANCY, THIRD TRIMESTER: ICD-10-CM

## 2023-10-12 DIAGNOSIS — O99.891 OTHER SPECIFIED DISEASES AND CONDITIONS COMPLICATING PREGNANCY: ICD-10-CM

## 2023-10-12 DIAGNOSIS — O99.513 DISEASES OF THE RESPIRATORY SYSTEM COMPLICATING PREGNANCY, THIRD TRIMESTER: ICD-10-CM

## 2023-10-12 DIAGNOSIS — I45.6 PRE-EXCITATION SYNDROME: ICD-10-CM

## 2023-10-12 DIAGNOSIS — R10.2 PELVIC AND PERINEAL PAIN: ICD-10-CM

## 2023-10-12 DIAGNOSIS — Z87.442 PERSONAL HISTORY OF URINARY CALCULI: ICD-10-CM

## 2023-10-12 DIAGNOSIS — O26.899 OTHER SPECIFIED PREGNANCY RELATED CONDITIONS, UNSPECIFIED TRIMESTER: ICD-10-CM

## 2023-10-12 DIAGNOSIS — O36.8130 DECREASED FETAL MOVEMENTS, THIRD TRIMESTER, NOT APPLICABLE OR UNSPECIFIED: ICD-10-CM

## 2023-10-12 DIAGNOSIS — M54.9 DORSALGIA, UNSPECIFIED: ICD-10-CM

## 2023-10-12 DIAGNOSIS — Z3A.33 33 WEEKS GESTATION OF PREGNANCY: ICD-10-CM

## 2023-10-12 LAB
ALBUMIN SERPL ELPH-MCNC: 3.5 G/DL — SIGNIFICANT CHANGE UP (ref 3.3–5)
ALP SERPL-CCNC: 101 U/L — SIGNIFICANT CHANGE UP (ref 40–120)
ALT FLD-CCNC: 10 U/L — SIGNIFICANT CHANGE UP (ref 4–33)
ANION GAP SERPL CALC-SCNC: 10 MMOL/L — SIGNIFICANT CHANGE UP (ref 7–14)
APPEARANCE UR: CLEAR — SIGNIFICANT CHANGE UP
APTT BLD: 25.3 SEC — SIGNIFICANT CHANGE UP (ref 24.5–35.6)
AST SERPL-CCNC: 20 U/L — SIGNIFICANT CHANGE UP (ref 4–32)
BASOPHILS # BLD AUTO: 0.03 K/UL — SIGNIFICANT CHANGE UP (ref 0–0.2)
BASOPHILS NFR BLD AUTO: 0.4 % — SIGNIFICANT CHANGE UP (ref 0–2)
BILIRUB SERPL-MCNC: 0.2 MG/DL — SIGNIFICANT CHANGE UP (ref 0.2–1.2)
BILIRUB UR-MCNC: NEGATIVE — SIGNIFICANT CHANGE UP
BUN SERPL-MCNC: 7 MG/DL — SIGNIFICANT CHANGE UP (ref 7–23)
CALCIUM SERPL-MCNC: 8.9 MG/DL — SIGNIFICANT CHANGE UP (ref 8.4–10.5)
CHLORIDE SERPL-SCNC: 105 MMOL/L — SIGNIFICANT CHANGE UP (ref 98–107)
CO2 SERPL-SCNC: 22 MMOL/L — SIGNIFICANT CHANGE UP (ref 22–31)
COLOR SPEC: YELLOW — SIGNIFICANT CHANGE UP
CREAT ?TM UR-MCNC: 28 MG/DL — SIGNIFICANT CHANGE UP
CREAT SERPL-MCNC: 0.55 MG/DL — SIGNIFICANT CHANGE UP (ref 0.5–1.3)
DIFF PNL FLD: NEGATIVE — SIGNIFICANT CHANGE UP
EGFR: 129 ML/MIN/1.73M2 — SIGNIFICANT CHANGE UP
EOSINOPHIL # BLD AUTO: 0.28 K/UL — SIGNIFICANT CHANGE UP (ref 0–0.5)
EOSINOPHIL NFR BLD AUTO: 3.8 % — SIGNIFICANT CHANGE UP (ref 0–6)
FIBRINOGEN PPP-MCNC: 340 MG/DL — SIGNIFICANT CHANGE UP (ref 200–465)
GLUCOSE SERPL-MCNC: 117 MG/DL — HIGH (ref 70–99)
GLUCOSE UR QL: NEGATIVE MG/DL — SIGNIFICANT CHANGE UP
HCT VFR BLD CALC: 33.8 % — LOW (ref 34.5–45)
HGB BLD-MCNC: 11.8 G/DL — SIGNIFICANT CHANGE UP (ref 11.5–15.5)
IANC: 4.82 K/UL — SIGNIFICANT CHANGE UP (ref 1.8–7.4)
IMM GRANULOCYTES NFR BLD AUTO: 1.6 % — HIGH (ref 0–0.9)
INR BLD: 0.92 RATIO — SIGNIFICANT CHANGE UP (ref 0.85–1.18)
KETONES UR-MCNC: NEGATIVE MG/DL — SIGNIFICANT CHANGE UP
LDH SERPL L TO P-CCNC: 184 U/L — SIGNIFICANT CHANGE UP (ref 135–225)
LEUKOCYTE ESTERASE UR-ACNC: NEGATIVE — SIGNIFICANT CHANGE UP
LYMPHOCYTES # BLD AUTO: 1.55 K/UL — SIGNIFICANT CHANGE UP (ref 1–3.3)
LYMPHOCYTES # BLD AUTO: 21.3 % — SIGNIFICANT CHANGE UP (ref 13–44)
MCHC RBC-ENTMCNC: 32.7 PG — SIGNIFICANT CHANGE UP (ref 27–34)
MCHC RBC-ENTMCNC: 34.9 GM/DL — SIGNIFICANT CHANGE UP (ref 32–36)
MCV RBC AUTO: 93.6 FL — SIGNIFICANT CHANGE UP (ref 80–100)
MONOCYTES # BLD AUTO: 0.49 K/UL — SIGNIFICANT CHANGE UP (ref 0–0.9)
MONOCYTES NFR BLD AUTO: 6.7 % — SIGNIFICANT CHANGE UP (ref 2–14)
NEUTROPHILS # BLD AUTO: 4.82 K/UL — SIGNIFICANT CHANGE UP (ref 1.8–7.4)
NEUTROPHILS NFR BLD AUTO: 66.2 % — SIGNIFICANT CHANGE UP (ref 43–77)
NITRITE UR-MCNC: NEGATIVE — SIGNIFICANT CHANGE UP
NRBC # BLD: 0 /100 WBCS — SIGNIFICANT CHANGE UP (ref 0–0)
NRBC # FLD: 0 K/UL — SIGNIFICANT CHANGE UP (ref 0–0)
PH UR: 6.5 — SIGNIFICANT CHANGE UP (ref 5–8)
PLATELET # BLD AUTO: 159 K/UL — SIGNIFICANT CHANGE UP (ref 150–400)
POTASSIUM SERPL-MCNC: 3.6 MMOL/L — SIGNIFICANT CHANGE UP (ref 3.5–5.3)
POTASSIUM SERPL-SCNC: 3.6 MMOL/L — SIGNIFICANT CHANGE UP (ref 3.5–5.3)
PROT ?TM UR-MCNC: 4 MG/DL — SIGNIFICANT CHANGE UP
PROT ?TM UR-MCNC: 4 MG/DL — SIGNIFICANT CHANGE UP
PROT SERPL-MCNC: 5.9 G/DL — LOW (ref 6–8.3)
PROT UR-MCNC: NEGATIVE MG/DL — SIGNIFICANT CHANGE UP
PROT/CREAT UR-RTO: 0.2 RATIO — SIGNIFICANT CHANGE UP (ref 0–0.2)
PROTHROM AB SERPL-ACNC: 10.3 SEC — SIGNIFICANT CHANGE UP (ref 9.5–13)
RBC # BLD: 3.61 M/UL — LOW (ref 3.8–5.2)
RBC # FLD: 11.9 % — SIGNIFICANT CHANGE UP (ref 10.3–14.5)
SODIUM SERPL-SCNC: 137 MMOL/L — SIGNIFICANT CHANGE UP (ref 135–145)
SP GR SPEC: 1.01 — SIGNIFICANT CHANGE UP (ref 1–1.03)
URATE SERPL-MCNC: 3 MG/DL — SIGNIFICANT CHANGE UP (ref 2.5–7)
UROBILINOGEN FLD QL: 0.2 MG/DL — SIGNIFICANT CHANGE UP (ref 0.2–1)
WBC # BLD: 7.29 K/UL — SIGNIFICANT CHANGE UP (ref 3.8–10.5)
WBC # FLD AUTO: 7.29 K/UL — SIGNIFICANT CHANGE UP (ref 3.8–10.5)

## 2023-10-12 PROCEDURE — 76819 FETAL BIOPHYS PROFIL W/O NST: CPT | Mod: 59

## 2023-10-12 PROCEDURE — 99221 1ST HOSP IP/OBS SF/LOW 40: CPT | Mod: 25

## 2023-10-12 PROCEDURE — 76816 OB US FOLLOW-UP PER FETUS: CPT

## 2023-10-12 PROCEDURE — ZZZZZ: CPT

## 2023-10-12 PROCEDURE — 59025 FETAL NON-STRESS TEST: CPT | Mod: 26

## 2023-10-12 RX ORDER — SODIUM CHLORIDE 9 MG/ML
1000 INJECTION, SOLUTION INTRAVENOUS ONCE
Refills: 0 | Status: COMPLETED | OUTPATIENT
Start: 2023-10-12 | End: 2023-10-12

## 2023-10-12 RX ADMIN — SODIUM CHLORIDE 1000 MILLILITER(S): 9 INJECTION, SOLUTION INTRAVENOUS at 21:17

## 2023-10-12 NOTE — OB PROVIDER TRIAGE NOTE - HISTORY OF PRESENT ILLNESS
27 y/o  at 33.6 weeks presents with elevated BP at home of 140/70. States she first had slightly elevated BP at the OB office today of 139/70 and was asked to check her BP at home. Report mild headache , 4/10 in severity. Also reports spotty vision throughout the pregnancy, no recent change in vision. Denies epigastric/RUQ pain. Reports fetal movement.  Antepartum Hx: Patient being followed by MFM for small EFW, not IUGR.

## 2023-10-12 NOTE — OB PROVIDER TRIAGE NOTE - NSHPPHYSICALEXAM_GEN_ALL_CORE
A&O x 3  Lungs: breathing comfortably on RA  Abd: gravid, soft nontender to palpation  EFM: baseline, moderate variability, + accels, no decels, ctx q  reactive NST  SSE: pooling, nitrazine, fern  SVE:   TAS: presentation, placenta, BPP MARCIAL  TVS: CL , no funneling or dynamic changes  Images saved on ASOB Vital Signs Last 24 Hrs  T(C): 36.7 (12 Oct 2023 19:32), Max: 36.8 (12 Oct 2023 18:20)  T(F): 98.06 (12 Oct 2023 19:32), Max: 98.2 (12 Oct 2023 18:20)  HR: 81 (12 Oct 2023 22:22) (77 - 96)  BP: 110/56 (12 Oct 2023 22:22) (108/57 - 136/74)  BP(mean): --  RR: 17 (12 Oct 2023 19:32) (15 - 17)  SpO2: --    Parameters below as of 12 Oct 2023 18:20  Patient On (Oxygen Delivery Method): room air    A&O x 3  Lungs: breathing comfortably on RA  Abd: gravid, soft nontender to palpation  EFM: baseline 145, moderate variability, + accels, variabel decels, uterine irritability ctx  nonreactive NST  TAS: vertex presentation, posterior placenta, BPP 8/8 MARCIAL 13.43  Images saved on ASOB

## 2023-10-12 NOTE — OB PROVIDER TRIAGE NOTE - NSOBPROVIDERNOTE_OBGYN_ALL_OB_FT
- HELLP labs sent  - Patient declines tyenol at this time 29 y/o  at 33.6 weeks presents with elevated BP at home of 140/70. States she first had slightly elevated BP at the OB office today of 139/70  - HELLP labs sent  - Patient declines tyenol at this time  - Variable decels, 1 L bolus given with improvement     - HELLP labs wnl, PCR 0.2  - Patient reports headache has resolved 27 y/o  at 33.6 weeks presents with elevated BP at home of 140/70. States she first had slightly elevated BP at the OB office today of 139/70  - HELLP labs sent  - Patient declines tyenol at this time  - Variable decels, 1 L bolus given with improvement     - HELLP labs wnl, PCR 0.2  - Patient reports headache has resolved  - Reactive NST, BPP   - Patient stable for discharge home with adequate outpatient follow up  - 24 urine protein kit given to start  - Continue to monitor BP   - Patient stable for discharge home with adequate outpatient follow up  - Instructed patient to follow up with OB/GYN within 1 week  - Educated and discussed  labor precautions  - Educated and discussed with patient to take blood pressure 2x/day at home, if systolic greater than or equal to 140 and/or diastolic greater than or equal to 90, call OBGYN or return to L&D   - Educated and discussed with patient regarding signs and symptoms of hypertension in pregnancy, and educated on importance of notifying OB/GYN for signs and symptoms including but not limited to headache, vision changes, shortness of breath, chest pain, severe abdominal pain especially in RUQ and epigastrium, swelling  - Educated and discussed concerning signs/symptoms to call OB/GYN and return to L&D including but not limited to vaginal bleeding, leakage of fluid, painful contractions, decreased fetal movement, fever/chills, shortness of breath, chest pain, rash, difficulty ambulating, nausea/vomiting/diarrhea, worsening of symptoms  - Patient states she understands and agrees with assessment and plan, all questions answered  - Discussed with Dr. Linder  - Patient discharged at 10:33pm

## 2023-10-12 NOTE — OB PROVIDER TRIAGE NOTE - NSHPLABSRESULTS_GEN_ALL_CORE
CBC Full  -  ( 12 Oct 2023 19:49 )  WBC Count : 7.29 K/uL  RBC Count : 3.61 M/uL  Hemoglobin : 11.8 g/dL  Hematocrit : 33.8 %  Platelet Count - Automated : 159 K/uL  Mean Cell Volume : 93.6 fL  Mean Cell Hemoglobin : 32.7 pg  Mean Cell Hemoglobin Concentration : 34.9 gm/dL  Auto Neutrophil # : 4.82 K/uL  Auto Lymphocyte # : 1.55 K/uL  Auto Monocyte # : 0.49 K/uL  Auto Eosinophil # : 0.28 K/uL  Auto Basophil # : 0.03 K/uL  Auto Neutrophil % : 66.2 %  Auto Lymphocyte % : 21.3 %  Auto Monocyte % : 6.7 %  Auto Eosinophil % : 3.8 %  Auto Basophil % : 0.4 %    10-12    137  |  105  |  7   ----------------------------<  117<H>  3.6   |  22  |  0.55    Ca    8.9      12 Oct 2023 19:49    TPro  5.9<L>  /  Alb  3.5  /  TBili  0.2  /  DBili  x   /  AST  20  /  ALT  10  /  AlkPhos  101  10-12    PT/INR - ( 12 Oct 2023 19:49 )   PT: 10.3 sec;   INR: 0.92 ratio         PTT - ( 12 Oct 2023 19:49 )  PTT:25.3 sec    PCR 0.2

## 2023-10-12 NOTE — OB RN TRIAGE NOTE - FALL HARM RISK - UNIVERSAL INTERVENTIONS
Bed in lowest position, wheels locked, appropriate side rails in place/Call bell, personal items and telephone in reach/Instruct patient to call for assistance before getting out of bed or chair/Non-slip footwear when patient is out of bed/[place] to call system/Physically safe environment - no spills, clutter or unnecessary equipment/Purposeful Proactive Rounding/Room/bathroom lighting operational, light cord in reach

## 2023-10-12 NOTE — OB PROVIDER TRIAGE NOTE - ADDITIONAL INSTRUCTIONS
- Patient stable for discharge home with adequate outpatient follow up  - 24 urine protein kit given to start  - Continue to monitor BP   - Patient stable for discharge home with adequate outpatient follow up  - Instructed patient to follow up with OB/GYN within 1 week  - Educated and discussed  labor precautions  - Educated and discussed with patient to take blood pressure 2x/day at home, if systolic greater than or equal to 140 and/or diastolic greater than or equal to 90, call OBGYN or return to L&D   - Educated and discussed with patient regarding signs and symptoms of hypertension in pregnancy, and educated on importance of notifying OB/GYN for signs and symptoms including but not limited to headache, vision changes, shortness of breath, chest pain, severe abdominal pain especially in RUQ and epigastrium, swelling  - Educated and discussed concerning signs/symptoms to call OB/GYN and return to L&D including but not limited to vaginal bleeding, leakage of fluid, painful contractions, decreased fetal movement, fever/chills, shortness of breath, chest pain, rash, difficulty ambulating, nausea/vomiting/diarrhea, worsening of symptoms  - Patient states she understands and agrees with assessment and plan, all questions answered

## 2023-10-12 NOTE — OB RN TRIAGE NOTE - NS_TRIAGEADDITIONAL COMMENTS_OBGYN_ALL_OB_FT
5-6  180 5-6  180  RAC 18G saline locked, HELPP labs drawn 5-6  180  RAC 18G saline locked, HELPP labs drawn  WV 0.2 5-6  180  RAC 18G saline locked, HELPP labs drawn  PcR 0.2

## 2023-10-13 DIAGNOSIS — O99.513 DISEASES OF THE RESPIRATORY SYSTEM COMPLICATING PREGNANCY, THIRD TRIMESTER: ICD-10-CM

## 2023-10-13 DIAGNOSIS — J45.909 UNSPECIFIED ASTHMA, UNCOMPLICATED: ICD-10-CM

## 2023-10-13 DIAGNOSIS — O36.8330 MATERNAL CARE FOR ABNORMALITIES OF THE FETAL HEART RATE OR RHYTHM, THIRD TRIMESTER, NOT APPLICABLE OR UNSPECIFIED: ICD-10-CM

## 2023-10-13 DIAGNOSIS — I45.6 PRE-EXCITATION SYNDROME: ICD-10-CM

## 2023-10-13 DIAGNOSIS — O16.3 UNSPECIFIED MATERNAL HYPERTENSION, THIRD TRIMESTER: ICD-10-CM

## 2023-10-13 DIAGNOSIS — O99.413 DISEASES OF THE CIRCULATORY SYSTEM COMPLICATING PREGNANCY, THIRD TRIMESTER: ICD-10-CM

## 2023-10-13 DIAGNOSIS — Z87.442 PERSONAL HISTORY OF URINARY CALCULI: ICD-10-CM

## 2023-10-13 DIAGNOSIS — Z3A.33 33 WEEKS GESTATION OF PREGNANCY: ICD-10-CM

## 2023-10-13 PROBLEM — N20.0 CALCULUS OF KIDNEY: Chronic | Status: ACTIVE | Noted: 2023-10-10

## 2023-10-20 ENCOUNTER — OUTPATIENT (OUTPATIENT)
Dept: INPATIENT UNIT | Facility: HOSPITAL | Age: 28
LOS: 1 days | Discharge: HOME | End: 2023-10-20
Payer: COMMERCIAL

## 2023-10-20 ENCOUNTER — ASOB RESULT (OUTPATIENT)
Age: 28
End: 2023-10-20

## 2023-10-20 ENCOUNTER — APPOINTMENT (OUTPATIENT)
Dept: ANTEPARTUM | Facility: CLINIC | Age: 28
End: 2023-10-20
Payer: COMMERCIAL

## 2023-10-20 VITALS
SYSTOLIC BLOOD PRESSURE: 124 MMHG | DIASTOLIC BLOOD PRESSURE: 75 MMHG | RESPIRATION RATE: 16 BRPM | TEMPERATURE: 98 F | HEART RATE: 92 BPM

## 2023-10-20 VITALS — DIASTOLIC BLOOD PRESSURE: 66 MMHG | SYSTOLIC BLOOD PRESSURE: 121 MMHG | HEART RATE: 98 BPM

## 2023-10-20 DIAGNOSIS — O26.899 OTHER SPECIFIED PREGNANCY RELATED CONDITIONS, UNSPECIFIED TRIMESTER: ICD-10-CM

## 2023-10-20 LAB
ALBUMIN SERPL ELPH-MCNC: 3.5 G/DL — SIGNIFICANT CHANGE UP (ref 3.3–5)
ALBUMIN SERPL ELPH-MCNC: 3.5 G/DL — SIGNIFICANT CHANGE UP (ref 3.3–5)
ALP SERPL-CCNC: 110 U/L — SIGNIFICANT CHANGE UP (ref 40–120)
ALP SERPL-CCNC: 110 U/L — SIGNIFICANT CHANGE UP (ref 40–120)
ALT FLD-CCNC: 11 U/L — SIGNIFICANT CHANGE UP (ref 4–33)
ALT FLD-CCNC: 11 U/L — SIGNIFICANT CHANGE UP (ref 4–33)
ANION GAP SERPL CALC-SCNC: 12 MMOL/L — SIGNIFICANT CHANGE UP (ref 7–14)
ANION GAP SERPL CALC-SCNC: 12 MMOL/L — SIGNIFICANT CHANGE UP (ref 7–14)
APPEARANCE UR: CLEAR — SIGNIFICANT CHANGE UP
APPEARANCE UR: CLEAR — SIGNIFICANT CHANGE UP
APTT BLD: 23.3 SEC — LOW (ref 24.5–35.6)
APTT BLD: 23.3 SEC — LOW (ref 24.5–35.6)
AST SERPL-CCNC: 21 U/L — SIGNIFICANT CHANGE UP (ref 4–32)
AST SERPL-CCNC: 21 U/L — SIGNIFICANT CHANGE UP (ref 4–32)
BASOPHILS # BLD AUTO: 0.05 K/UL — SIGNIFICANT CHANGE UP (ref 0–0.2)
BASOPHILS # BLD AUTO: 0.05 K/UL — SIGNIFICANT CHANGE UP (ref 0–0.2)
BASOPHILS NFR BLD AUTO: 0.7 % — SIGNIFICANT CHANGE UP (ref 0–2)
BASOPHILS NFR BLD AUTO: 0.7 % — SIGNIFICANT CHANGE UP (ref 0–2)
BILIRUB SERPL-MCNC: 0.2 MG/DL — SIGNIFICANT CHANGE UP (ref 0.2–1.2)
BILIRUB SERPL-MCNC: 0.2 MG/DL — SIGNIFICANT CHANGE UP (ref 0.2–1.2)
BILIRUB UR-MCNC: NEGATIVE — SIGNIFICANT CHANGE UP
BILIRUB UR-MCNC: NEGATIVE — SIGNIFICANT CHANGE UP
BUN SERPL-MCNC: 9 MG/DL — SIGNIFICANT CHANGE UP (ref 7–23)
BUN SERPL-MCNC: 9 MG/DL — SIGNIFICANT CHANGE UP (ref 7–23)
CALCIUM SERPL-MCNC: 8.9 MG/DL — SIGNIFICANT CHANGE UP (ref 8.4–10.5)
CALCIUM SERPL-MCNC: 8.9 MG/DL — SIGNIFICANT CHANGE UP (ref 8.4–10.5)
CHLORIDE SERPL-SCNC: 102 MMOL/L — SIGNIFICANT CHANGE UP (ref 98–107)
CHLORIDE SERPL-SCNC: 102 MMOL/L — SIGNIFICANT CHANGE UP (ref 98–107)
CO2 SERPL-SCNC: 22 MMOL/L — SIGNIFICANT CHANGE UP (ref 22–31)
CO2 SERPL-SCNC: 22 MMOL/L — SIGNIFICANT CHANGE UP (ref 22–31)
COLOR SPEC: YELLOW — SIGNIFICANT CHANGE UP
COLOR SPEC: YELLOW — SIGNIFICANT CHANGE UP
CREAT ?TM UR-MCNC: 35 MG/DL — SIGNIFICANT CHANGE UP
CREAT ?TM UR-MCNC: 35 MG/DL — SIGNIFICANT CHANGE UP
CREAT SERPL-MCNC: 0.58 MG/DL — SIGNIFICANT CHANGE UP (ref 0.5–1.3)
CREAT SERPL-MCNC: 0.58 MG/DL — SIGNIFICANT CHANGE UP (ref 0.5–1.3)
DIFF PNL FLD: NEGATIVE — SIGNIFICANT CHANGE UP
DIFF PNL FLD: NEGATIVE — SIGNIFICANT CHANGE UP
EGFR: 127 ML/MIN/1.73M2 — SIGNIFICANT CHANGE UP
EGFR: 127 ML/MIN/1.73M2 — SIGNIFICANT CHANGE UP
EOSINOPHIL # BLD AUTO: 0.31 K/UL — SIGNIFICANT CHANGE UP (ref 0–0.5)
EOSINOPHIL # BLD AUTO: 0.31 K/UL — SIGNIFICANT CHANGE UP (ref 0–0.5)
EOSINOPHIL NFR BLD AUTO: 4.2 % — SIGNIFICANT CHANGE UP (ref 0–6)
EOSINOPHIL NFR BLD AUTO: 4.2 % — SIGNIFICANT CHANGE UP (ref 0–6)
FIBRINOGEN PPP-MCNC: 443 MG/DL — SIGNIFICANT CHANGE UP (ref 200–465)
FIBRINOGEN PPP-MCNC: 443 MG/DL — SIGNIFICANT CHANGE UP (ref 200–465)
GLUCOSE SERPL-MCNC: 75 MG/DL — SIGNIFICANT CHANGE UP (ref 70–99)
GLUCOSE SERPL-MCNC: 75 MG/DL — SIGNIFICANT CHANGE UP (ref 70–99)
GLUCOSE UR QL: NEGATIVE MG/DL — SIGNIFICANT CHANGE UP
GLUCOSE UR QL: NEGATIVE MG/DL — SIGNIFICANT CHANGE UP
HCT VFR BLD CALC: 34.8 % — SIGNIFICANT CHANGE UP (ref 34.5–45)
HCT VFR BLD CALC: 34.8 % — SIGNIFICANT CHANGE UP (ref 34.5–45)
HGB BLD-MCNC: 12.2 G/DL — SIGNIFICANT CHANGE UP (ref 11.5–15.5)
HGB BLD-MCNC: 12.2 G/DL — SIGNIFICANT CHANGE UP (ref 11.5–15.5)
IANC: 5.11 K/UL — SIGNIFICANT CHANGE UP (ref 1.8–7.4)
IANC: 5.11 K/UL — SIGNIFICANT CHANGE UP (ref 1.8–7.4)
IMM GRANULOCYTES NFR BLD AUTO: 1.9 % — HIGH (ref 0–0.9)
IMM GRANULOCYTES NFR BLD AUTO: 1.9 % — HIGH (ref 0–0.9)
INR BLD: <0.9 RATIO — SIGNIFICANT CHANGE UP (ref 0.85–1.18)
INR BLD: <0.9 RATIO — SIGNIFICANT CHANGE UP (ref 0.85–1.18)
KETONES UR-MCNC: NEGATIVE MG/DL — SIGNIFICANT CHANGE UP
KETONES UR-MCNC: NEGATIVE MG/DL — SIGNIFICANT CHANGE UP
LDH SERPL L TO P-CCNC: 187 U/L — SIGNIFICANT CHANGE UP (ref 135–225)
LDH SERPL L TO P-CCNC: 187 U/L — SIGNIFICANT CHANGE UP (ref 135–225)
LEUKOCYTE ESTERASE UR-ACNC: NEGATIVE — SIGNIFICANT CHANGE UP
LEUKOCYTE ESTERASE UR-ACNC: NEGATIVE — SIGNIFICANT CHANGE UP
LYMPHOCYTES # BLD AUTO: 1.21 K/UL — SIGNIFICANT CHANGE UP (ref 1–3.3)
LYMPHOCYTES # BLD AUTO: 1.21 K/UL — SIGNIFICANT CHANGE UP (ref 1–3.3)
LYMPHOCYTES # BLD AUTO: 16.5 % — SIGNIFICANT CHANGE UP (ref 13–44)
LYMPHOCYTES # BLD AUTO: 16.5 % — SIGNIFICANT CHANGE UP (ref 13–44)
MCHC RBC-ENTMCNC: 32 PG — SIGNIFICANT CHANGE UP (ref 27–34)
MCHC RBC-ENTMCNC: 32 PG — SIGNIFICANT CHANGE UP (ref 27–34)
MCHC RBC-ENTMCNC: 35.1 GM/DL — SIGNIFICANT CHANGE UP (ref 32–36)
MCHC RBC-ENTMCNC: 35.1 GM/DL — SIGNIFICANT CHANGE UP (ref 32–36)
MCV RBC AUTO: 91.3 FL — SIGNIFICANT CHANGE UP (ref 80–100)
MCV RBC AUTO: 91.3 FL — SIGNIFICANT CHANGE UP (ref 80–100)
MONOCYTES # BLD AUTO: 0.52 K/UL — SIGNIFICANT CHANGE UP (ref 0–0.9)
MONOCYTES # BLD AUTO: 0.52 K/UL — SIGNIFICANT CHANGE UP (ref 0–0.9)
MONOCYTES NFR BLD AUTO: 7.1 % — SIGNIFICANT CHANGE UP (ref 2–14)
MONOCYTES NFR BLD AUTO: 7.1 % — SIGNIFICANT CHANGE UP (ref 2–14)
NEUTROPHILS # BLD AUTO: 5.11 K/UL — SIGNIFICANT CHANGE UP (ref 1.8–7.4)
NEUTROPHILS # BLD AUTO: 5.11 K/UL — SIGNIFICANT CHANGE UP (ref 1.8–7.4)
NEUTROPHILS NFR BLD AUTO: 69.6 % — SIGNIFICANT CHANGE UP (ref 43–77)
NEUTROPHILS NFR BLD AUTO: 69.6 % — SIGNIFICANT CHANGE UP (ref 43–77)
NITRITE UR-MCNC: NEGATIVE — SIGNIFICANT CHANGE UP
NITRITE UR-MCNC: NEGATIVE — SIGNIFICANT CHANGE UP
NRBC # BLD: 0 /100 WBCS — SIGNIFICANT CHANGE UP (ref 0–0)
NRBC # BLD: 0 /100 WBCS — SIGNIFICANT CHANGE UP (ref 0–0)
NRBC # FLD: 0 K/UL — SIGNIFICANT CHANGE UP (ref 0–0)
NRBC # FLD: 0 K/UL — SIGNIFICANT CHANGE UP (ref 0–0)
PH UR: 7 — SIGNIFICANT CHANGE UP (ref 5–8)
PH UR: 7 — SIGNIFICANT CHANGE UP (ref 5–8)
PLATELET # BLD AUTO: 141 K/UL — LOW (ref 150–400)
PLATELET # BLD AUTO: 141 K/UL — LOW (ref 150–400)
POTASSIUM SERPL-MCNC: 4 MMOL/L — SIGNIFICANT CHANGE UP (ref 3.5–5.3)
POTASSIUM SERPL-MCNC: 4 MMOL/L — SIGNIFICANT CHANGE UP (ref 3.5–5.3)
POTASSIUM SERPL-SCNC: 4 MMOL/L — SIGNIFICANT CHANGE UP (ref 3.5–5.3)
POTASSIUM SERPL-SCNC: 4 MMOL/L — SIGNIFICANT CHANGE UP (ref 3.5–5.3)
PROT ?TM UR-MCNC: 4 MG/DL — SIGNIFICANT CHANGE UP
PROT SERPL-MCNC: 6.2 G/DL — SIGNIFICANT CHANGE UP (ref 6–8.3)
PROT SERPL-MCNC: 6.2 G/DL — SIGNIFICANT CHANGE UP (ref 6–8.3)
PROT UR-MCNC: NEGATIVE MG/DL — SIGNIFICANT CHANGE UP
PROT UR-MCNC: NEGATIVE MG/DL — SIGNIFICANT CHANGE UP
PROT/CREAT UR-RTO: 0.1 RATIO — SIGNIFICANT CHANGE UP (ref 0–0.2)
PROT/CREAT UR-RTO: 0.1 RATIO — SIGNIFICANT CHANGE UP (ref 0–0.2)
PROTHROM AB SERPL-ACNC: 10 SEC — SIGNIFICANT CHANGE UP (ref 9.5–13)
PROTHROM AB SERPL-ACNC: 10 SEC — SIGNIFICANT CHANGE UP (ref 9.5–13)
RBC # BLD: 3.81 M/UL — SIGNIFICANT CHANGE UP (ref 3.8–5.2)
RBC # BLD: 3.81 M/UL — SIGNIFICANT CHANGE UP (ref 3.8–5.2)
RBC # FLD: 11.9 % — SIGNIFICANT CHANGE UP (ref 10.3–14.5)
RBC # FLD: 11.9 % — SIGNIFICANT CHANGE UP (ref 10.3–14.5)
SODIUM SERPL-SCNC: 136 MMOL/L — SIGNIFICANT CHANGE UP (ref 135–145)
SODIUM SERPL-SCNC: 136 MMOL/L — SIGNIFICANT CHANGE UP (ref 135–145)
SP GR SPEC: 1.01 — SIGNIFICANT CHANGE UP (ref 1–1.03)
SP GR SPEC: 1.01 — SIGNIFICANT CHANGE UP (ref 1–1.03)
URATE SERPL-MCNC: 3.8 MG/DL — SIGNIFICANT CHANGE UP (ref 2.5–7)
URATE SERPL-MCNC: 3.8 MG/DL — SIGNIFICANT CHANGE UP (ref 2.5–7)
UROBILINOGEN FLD QL: 0.2 MG/DL — SIGNIFICANT CHANGE UP (ref 0.2–1)
UROBILINOGEN FLD QL: 0.2 MG/DL — SIGNIFICANT CHANGE UP (ref 0.2–1)
WBC # BLD: 7.34 K/UL — SIGNIFICANT CHANGE UP (ref 3.8–10.5)
WBC # BLD: 7.34 K/UL — SIGNIFICANT CHANGE UP (ref 3.8–10.5)
WBC # FLD AUTO: 7.34 K/UL — SIGNIFICANT CHANGE UP (ref 3.8–10.5)
WBC # FLD AUTO: 7.34 K/UL — SIGNIFICANT CHANGE UP (ref 3.8–10.5)

## 2023-10-20 PROCEDURE — 99222 1ST HOSP IP/OBS MODERATE 55: CPT | Mod: 25

## 2023-10-20 PROCEDURE — 76815 OB US LIMITED FETUS(S): CPT | Mod: 26

## 2023-10-20 PROCEDURE — 59025 FETAL NON-STRESS TEST: CPT | Mod: 26

## 2023-10-20 PROCEDURE — 76819 FETAL BIOPHYS PROFIL W/O NST: CPT | Mod: 26

## 2023-10-20 RX ORDER — METOCLOPRAMIDE HCL 10 MG
10 TABLET ORAL ONCE
Refills: 0 | Status: COMPLETED | OUTPATIENT
Start: 2023-10-20 | End: 2023-10-20

## 2023-10-20 RX ORDER — SODIUM CHLORIDE 9 MG/ML
3 INJECTION INTRAMUSCULAR; INTRAVENOUS; SUBCUTANEOUS EVERY 8 HOURS
Refills: 0 | Status: DISCONTINUED | OUTPATIENT
Start: 2023-10-20 | End: 2023-11-03

## 2023-10-20 RX ORDER — ACETAMINOPHEN 500 MG
1000 TABLET ORAL ONCE
Refills: 0 | Status: COMPLETED | OUTPATIENT
Start: 2023-10-20 | End: 2023-10-20

## 2023-10-20 RX ADMIN — Medication 10 MILLIGRAM(S): at 09:43

## 2023-10-20 RX ADMIN — Medication 1000 MILLIGRAM(S): at 09:39

## 2023-10-20 NOTE — OB PROVIDER TRIAGE NOTE - NSHPLABSRESULTS_GEN_ALL_CORE
UA- negative for protein with a PCR of 0.1              12.2  7.34>---------<141             34.8      136 I 102 I 9  -----------------<75   AST-21/ ALT-11; uric acid-3.8; LDH-187  4.0 I 22 I 0.58      PT-10.0/ INR <0.90/ PTT-23.3/ fibrinogen-443

## 2023-10-20 NOTE — OB PROVIDER TRIAGE NOTE - NSHPPHYSICALEXAM_GEN_ALL_CORE
ICU Vital Signs Last 24 Hrs  T(C): 36.8 (20 Oct 2023 08:53), Max: 36.8 (20 Oct 2023 08:53)  T(F): 98.2 (20 Oct 2023 08:53), Max: 98.2 (20 Oct 2023 08:53)  HR: 88 (20 Oct 2023 09:25) (88 - 92)  BP: 125/73 (20 Oct 2023 09:25) (124/75 - 125/73)  BP(mean): --  ABP: --  ABP(mean): --  RR: 16 (20 Oct 2023 08:53) (16 - 16)  SpO2: --    Gen: A&O x 3; NAD    Neuro- symmetrical facial features with no slurring of words  Pulm- breathing is unlabored  Abd exam- soft and nontender  Extremities- full range of motion x 4    NST reactive with 150 baseline with accels and mod variability; no ctx's    Abd sono- ICU Vital Signs Last 24 Hrs  T(C): 36.8 (20 Oct 2023 08:53), Max: 36.8 (20 Oct 2023 08:53)  T(F): 98.2 (20 Oct 2023 08:53), Max: 98.2 (20 Oct 2023 08:53)  HR: 86 (20 Oct 2023 10:25) (86 - 102)  BP: 112/63 (20 Oct 2023 10:25) (112/63 - 125/73) in semi-Fowlers position  BP(mean): --  ABP: --  ABP(mean): --  RR: 16 (20 Oct 2023 08:53) (16 - 16)  SpO2: --    Gen: A&O x 3; NAD    Neuro- symmetrical facial features with no slurring of words  Pulm- breathing is unlabored  Abd exam- soft and nontender  Extremities- full range of motion x 4    NST reactive with 150 baseline with accels and mod variability; no ctx's    Abd sono- Images and report in ASOB- vtx; posterior placenta; MARCIAL-12.51; 8/8 BPP

## 2023-10-20 NOTE — OB PROVIDER TRIAGE NOTE - HISTORY OF PRESENT ILLNESS
29yo female P0 @ 35.0 wks SLIUP with GHTN here complaining of a HA that is 4/10, min relieved with Tylenol 1000mg last taken at 3 am. Pt reports she has had the occ scotoma for the past several weeks that her ob is aware of. Pt denies RUQ or epigastric pain. Pt with GFM and denies VB/LOF/ ctx's. Pt had HELLP labs drawn yesterday and were normal with exception of plts being at 149. Pt also submitted a 24h urine collection yesterday that resulted 150mg of protein. Pt took her BP this morning and had a 158/78, 150/88, 140/78, 142/80.    AP course complicated by GHTN

## 2023-10-20 NOTE — OB PROVIDER TRIAGE NOTE - NSOBPROVIDERNOTE_OBGYN_ALL_OB_FT
29yo female P0 @ 35.0 wks SLIUP complicated by GHTN here with HA minimally relieved by Tylenol  -BP's here are normotensive  -HELLP labs drawn yesterday were normal along with 24h urine; exception of plts of 149  -pt offered tylenol/ reglan/ benadryl; pt is refusing benadryl but will accept tylenol and reglan 27yo female P0 @ 35.0 wks SLIUP complicated by GHTN here with HA minimally relieved by Tylenol  -BP's here are normotensive  -HELLP labs drawn yesterday were normal along with 24h urine; exception of plts of 149  -pt offered tylenol/ reglan/ benadryl; pt is refusing benadryl but will accept tylenol and reglan  -----------------------------  10:25-pt reports HA has improved  -BP's are normotensive  -HELLP labs reveal a plt count of 141; otherwise normal  -Pt was discussed with Dr Ponce; coming over to speak to pt 27yo female P0 @ 35.0 wks SLIUP complicated by GHTN here with HA minimally relieved by Tylenol  -BP's here are normotensive  -HELLP labs drawn yesterday were normal along with 24h urine; exception of plts of 149  -pt offered tylenol/ reglan/ benadryl; pt is refusing benadryl but will accept tylenol and reglan  -----------------------------  10:25-pt reports HA has improved  -BP's are normotensive  -HELLP labs reveal a plt count of 141; otherwise normal  -Pt was discussed with Dr Ponce; coming over to speak to pt  ----------------------------  -10:50-Dr Pocne in to see and  pt  -pt was cleared for discharge home with instructions  -HELLP labs and BP's were reviewed with Dr Ponce  -pt was cleared for dc home with instructions to keep her scheduled appt for Tuesday 10/24/23  -pt was discharged home at 11:05

## 2023-10-23 DIAGNOSIS — O99.513 DISEASES OF THE RESPIRATORY SYSTEM COMPLICATING PREGNANCY, THIRD TRIMESTER: ICD-10-CM

## 2023-10-23 DIAGNOSIS — O13.3 GESTATIONAL [PREGNANCY-INDUCED] HYPERTENSION WITHOUT SIGNIFICANT PROTEINURIA, THIRD TRIMESTER: ICD-10-CM

## 2023-10-23 DIAGNOSIS — Z3A.35 35 WEEKS GESTATION OF PREGNANCY: ICD-10-CM

## 2023-10-23 DIAGNOSIS — J45.909 UNSPECIFIED ASTHMA, UNCOMPLICATED: ICD-10-CM

## 2023-10-26 ENCOUNTER — ASOB RESULT (OUTPATIENT)
Age: 28
End: 2023-10-26

## 2023-10-26 ENCOUNTER — APPOINTMENT (OUTPATIENT)
Dept: OBGYN | Facility: CLINIC | Age: 28
End: 2023-10-26
Payer: COMMERCIAL

## 2023-10-26 ENCOUNTER — APPOINTMENT (OUTPATIENT)
Dept: ANTEPARTUM | Facility: CLINIC | Age: 28
End: 2023-10-26
Payer: COMMERCIAL

## 2023-10-26 PROCEDURE — 76818 FETAL BIOPHYS PROFILE W/NST: CPT

## 2023-10-26 PROCEDURE — ZZZZZ: CPT

## 2023-10-26 PROCEDURE — 76816 OB US FOLLOW-UP PER FETUS: CPT

## 2023-11-01 ENCOUNTER — APPOINTMENT (OUTPATIENT)
Dept: OBGYN | Facility: CLINIC | Age: 28
End: 2023-11-01
Payer: COMMERCIAL

## 2023-11-01 ENCOUNTER — APPOINTMENT (OUTPATIENT)
Dept: ANTEPARTUM | Facility: CLINIC | Age: 28
End: 2023-11-01
Payer: COMMERCIAL

## 2023-11-01 ENCOUNTER — ASOB RESULT (OUTPATIENT)
Age: 28
End: 2023-11-01

## 2023-11-01 VITALS
DIASTOLIC BLOOD PRESSURE: 85 MMHG | SYSTOLIC BLOOD PRESSURE: 156 MMHG | HEIGHT: 66 IN | WEIGHT: 191 LBS | BODY MASS INDEX: 30.7 KG/M2

## 2023-11-01 VITALS — DIASTOLIC BLOOD PRESSURE: 77 MMHG | SYSTOLIC BLOOD PRESSURE: 138 MMHG

## 2023-11-01 VITALS — DIASTOLIC BLOOD PRESSURE: 74 MMHG | SYSTOLIC BLOOD PRESSURE: 116 MMHG

## 2023-11-01 PROCEDURE — 76818 FETAL BIOPHYS PROFILE W/NST: CPT

## 2023-11-01 PROCEDURE — ZZZZZ: CPT

## 2023-11-03 ENCOUNTER — INPATIENT (INPATIENT)
Facility: HOSPITAL | Age: 28
LOS: 2 days | Discharge: HOME | End: 2023-11-06
Attending: OBSTETRICS & GYNECOLOGY | Admitting: OBSTETRICS & GYNECOLOGY

## 2023-11-03 DIAGNOSIS — O13.3 GESTATIONAL [PREGNANCY-INDUCED] HYPERTENSION WITHOUT SIGNIFICANT PROTEINURIA, THIRD TRIMESTER: ICD-10-CM

## 2023-11-04 LAB
ALBUMIN SERPL ELPH-MCNC: 3.3 G/DL — SIGNIFICANT CHANGE UP (ref 3.3–5)
ALBUMIN SERPL ELPH-MCNC: 3.3 G/DL — SIGNIFICANT CHANGE UP (ref 3.3–5)
ALP SERPL-CCNC: 124 U/L — HIGH (ref 40–120)
ALP SERPL-CCNC: 124 U/L — HIGH (ref 40–120)
ALT FLD-CCNC: 12 U/L — SIGNIFICANT CHANGE UP (ref 4–33)
ALT FLD-CCNC: 12 U/L — SIGNIFICANT CHANGE UP (ref 4–33)
ANION GAP SERPL CALC-SCNC: 13 MMOL/L — SIGNIFICANT CHANGE UP (ref 7–14)
ANION GAP SERPL CALC-SCNC: 13 MMOL/L — SIGNIFICANT CHANGE UP (ref 7–14)
APPEARANCE UR: ABNORMAL
APPEARANCE UR: ABNORMAL
APTT BLD: 24.1 SEC — LOW (ref 24.5–35.6)
APTT BLD: 24.1 SEC — LOW (ref 24.5–35.6)
AST SERPL-CCNC: 23 U/L — SIGNIFICANT CHANGE UP (ref 4–32)
AST SERPL-CCNC: 23 U/L — SIGNIFICANT CHANGE UP (ref 4–32)
BACTERIA # UR AUTO: ABNORMAL /HPF
BACTERIA # UR AUTO: ABNORMAL /HPF
BASOPHILS # BLD AUTO: 0.06 K/UL — SIGNIFICANT CHANGE UP (ref 0–0.2)
BASOPHILS # BLD AUTO: 0.06 K/UL — SIGNIFICANT CHANGE UP (ref 0–0.2)
BASOPHILS NFR BLD AUTO: 0.6 % — SIGNIFICANT CHANGE UP (ref 0–2)
BASOPHILS NFR BLD AUTO: 0.6 % — SIGNIFICANT CHANGE UP (ref 0–2)
BILIRUB SERPL-MCNC: 0.2 MG/DL — SIGNIFICANT CHANGE UP (ref 0.2–1.2)
BILIRUB SERPL-MCNC: 0.2 MG/DL — SIGNIFICANT CHANGE UP (ref 0.2–1.2)
BILIRUB UR-MCNC: NEGATIVE — SIGNIFICANT CHANGE UP
BILIRUB UR-MCNC: NEGATIVE — SIGNIFICANT CHANGE UP
BUN SERPL-MCNC: 13 MG/DL — SIGNIFICANT CHANGE UP (ref 7–23)
BUN SERPL-MCNC: 13 MG/DL — SIGNIFICANT CHANGE UP (ref 7–23)
CALCIUM SERPL-MCNC: 9.1 MG/DL — SIGNIFICANT CHANGE UP (ref 8.4–10.5)
CALCIUM SERPL-MCNC: 9.1 MG/DL — SIGNIFICANT CHANGE UP (ref 8.4–10.5)
CAST: 0 /LPF — SIGNIFICANT CHANGE UP (ref 0–4)
CAST: 0 /LPF — SIGNIFICANT CHANGE UP (ref 0–4)
CHLORIDE SERPL-SCNC: 101 MMOL/L — SIGNIFICANT CHANGE UP (ref 98–107)
CHLORIDE SERPL-SCNC: 101 MMOL/L — SIGNIFICANT CHANGE UP (ref 98–107)
CO2 SERPL-SCNC: 20 MMOL/L — LOW (ref 22–31)
CO2 SERPL-SCNC: 20 MMOL/L — LOW (ref 22–31)
COLOR SPEC: YELLOW — SIGNIFICANT CHANGE UP
COLOR SPEC: YELLOW — SIGNIFICANT CHANGE UP
CREAT ?TM UR-MCNC: 145 MG/DL — SIGNIFICANT CHANGE UP
CREAT ?TM UR-MCNC: 145 MG/DL — SIGNIFICANT CHANGE UP
CREAT SERPL-MCNC: 0.54 MG/DL — SIGNIFICANT CHANGE UP (ref 0.5–1.3)
CREAT SERPL-MCNC: 0.54 MG/DL — SIGNIFICANT CHANGE UP (ref 0.5–1.3)
DIFF PNL FLD: NEGATIVE — SIGNIFICANT CHANGE UP
DIFF PNL FLD: NEGATIVE — SIGNIFICANT CHANGE UP
EGFR: 129 ML/MIN/1.73M2 — SIGNIFICANT CHANGE UP
EGFR: 129 ML/MIN/1.73M2 — SIGNIFICANT CHANGE UP
EOSINOPHIL # BLD AUTO: 0.38 K/UL — SIGNIFICANT CHANGE UP (ref 0–0.5)
EOSINOPHIL # BLD AUTO: 0.38 K/UL — SIGNIFICANT CHANGE UP (ref 0–0.5)
EOSINOPHIL NFR BLD AUTO: 3.9 % — SIGNIFICANT CHANGE UP (ref 0–6)
EOSINOPHIL NFR BLD AUTO: 3.9 % — SIGNIFICANT CHANGE UP (ref 0–6)
FIBRINOGEN PPP-MCNC: 426 MG/DL — SIGNIFICANT CHANGE UP (ref 200–465)
FIBRINOGEN PPP-MCNC: 426 MG/DL — SIGNIFICANT CHANGE UP (ref 200–465)
GLUCOSE SERPL-MCNC: 79 MG/DL — SIGNIFICANT CHANGE UP (ref 70–99)
GLUCOSE SERPL-MCNC: 79 MG/DL — SIGNIFICANT CHANGE UP (ref 70–99)
GLUCOSE UR QL: NEGATIVE MG/DL — SIGNIFICANT CHANGE UP
GLUCOSE UR QL: NEGATIVE MG/DL — SIGNIFICANT CHANGE UP
HCT VFR BLD CALC: 33.3 % — LOW (ref 34.5–45)
HCT VFR BLD CALC: 33.3 % — LOW (ref 34.5–45)
HGB BLD-MCNC: 12 G/DL — SIGNIFICANT CHANGE UP (ref 11.5–15.5)
HGB BLD-MCNC: 12 G/DL — SIGNIFICANT CHANGE UP (ref 11.5–15.5)
IANC: 6.59 K/UL — SIGNIFICANT CHANGE UP (ref 1.8–7.4)
IANC: 6.59 K/UL — SIGNIFICANT CHANGE UP (ref 1.8–7.4)
IMM GRANULOCYTES NFR BLD AUTO: 1.6 % — HIGH (ref 0–0.9)
IMM GRANULOCYTES NFR BLD AUTO: 1.6 % — HIGH (ref 0–0.9)
INR BLD: <0.9 RATIO — SIGNIFICANT CHANGE UP (ref 0.85–1.18)
INR BLD: <0.9 RATIO — SIGNIFICANT CHANGE UP (ref 0.85–1.18)
KETONES UR-MCNC: NEGATIVE MG/DL — SIGNIFICANT CHANGE UP
KETONES UR-MCNC: NEGATIVE MG/DL — SIGNIFICANT CHANGE UP
LDH SERPL L TO P-CCNC: 214 U/L — SIGNIFICANT CHANGE UP (ref 135–225)
LDH SERPL L TO P-CCNC: 214 U/L — SIGNIFICANT CHANGE UP (ref 135–225)
LEUKOCYTE ESTERASE UR-ACNC: ABNORMAL
LEUKOCYTE ESTERASE UR-ACNC: ABNORMAL
LYMPHOCYTES # BLD AUTO: 1.84 K/UL — SIGNIFICANT CHANGE UP (ref 1–3.3)
LYMPHOCYTES # BLD AUTO: 1.84 K/UL — SIGNIFICANT CHANGE UP (ref 1–3.3)
LYMPHOCYTES # BLD AUTO: 18.9 % — SIGNIFICANT CHANGE UP (ref 13–44)
LYMPHOCYTES # BLD AUTO: 18.9 % — SIGNIFICANT CHANGE UP (ref 13–44)
MCHC RBC-ENTMCNC: 32.4 PG — SIGNIFICANT CHANGE UP (ref 27–34)
MCHC RBC-ENTMCNC: 32.4 PG — SIGNIFICANT CHANGE UP (ref 27–34)
MCHC RBC-ENTMCNC: 36 GM/DL — SIGNIFICANT CHANGE UP (ref 32–36)
MCHC RBC-ENTMCNC: 36 GM/DL — SIGNIFICANT CHANGE UP (ref 32–36)
MCV RBC AUTO: 90 FL — SIGNIFICANT CHANGE UP (ref 80–100)
MCV RBC AUTO: 90 FL — SIGNIFICANT CHANGE UP (ref 80–100)
MONOCYTES # BLD AUTO: 0.72 K/UL — SIGNIFICANT CHANGE UP (ref 0–0.9)
MONOCYTES # BLD AUTO: 0.72 K/UL — SIGNIFICANT CHANGE UP (ref 0–0.9)
MONOCYTES NFR BLD AUTO: 7.4 % — SIGNIFICANT CHANGE UP (ref 2–14)
MONOCYTES NFR BLD AUTO: 7.4 % — SIGNIFICANT CHANGE UP (ref 2–14)
NEUTROPHILS # BLD AUTO: 6.59 K/UL — SIGNIFICANT CHANGE UP (ref 1.8–7.4)
NEUTROPHILS # BLD AUTO: 6.59 K/UL — SIGNIFICANT CHANGE UP (ref 1.8–7.4)
NEUTROPHILS NFR BLD AUTO: 67.6 % — SIGNIFICANT CHANGE UP (ref 43–77)
NEUTROPHILS NFR BLD AUTO: 67.6 % — SIGNIFICANT CHANGE UP (ref 43–77)
NITRITE UR-MCNC: NEGATIVE — SIGNIFICANT CHANGE UP
NITRITE UR-MCNC: NEGATIVE — SIGNIFICANT CHANGE UP
NRBC # BLD: 0 /100 WBCS — SIGNIFICANT CHANGE UP (ref 0–0)
NRBC # BLD: 0 /100 WBCS — SIGNIFICANT CHANGE UP (ref 0–0)
NRBC # FLD: 0 K/UL — SIGNIFICANT CHANGE UP (ref 0–0)
NRBC # FLD: 0 K/UL — SIGNIFICANT CHANGE UP (ref 0–0)
PH UR: 6.5 — SIGNIFICANT CHANGE UP (ref 5–8)
PH UR: 6.5 — SIGNIFICANT CHANGE UP (ref 5–8)
PLATELET # BLD AUTO: 177 K/UL — SIGNIFICANT CHANGE UP (ref 150–400)
PLATELET # BLD AUTO: 177 K/UL — SIGNIFICANT CHANGE UP (ref 150–400)
POTASSIUM SERPL-MCNC: 3.8 MMOL/L — SIGNIFICANT CHANGE UP (ref 3.5–5.3)
POTASSIUM SERPL-MCNC: 3.8 MMOL/L — SIGNIFICANT CHANGE UP (ref 3.5–5.3)
POTASSIUM SERPL-SCNC: 3.8 MMOL/L — SIGNIFICANT CHANGE UP (ref 3.5–5.3)
POTASSIUM SERPL-SCNC: 3.8 MMOL/L — SIGNIFICANT CHANGE UP (ref 3.5–5.3)
PROT ?TM UR-MCNC: 14 MG/DL — SIGNIFICANT CHANGE UP
PROT SERPL-MCNC: 6.1 G/DL — SIGNIFICANT CHANGE UP (ref 6–8.3)
PROT SERPL-MCNC: 6.1 G/DL — SIGNIFICANT CHANGE UP (ref 6–8.3)
PROT UR-MCNC: NEGATIVE MG/DL — SIGNIFICANT CHANGE UP
PROT UR-MCNC: NEGATIVE MG/DL — SIGNIFICANT CHANGE UP
PROT/CREAT UR-RTO: 0.1 RATIO — SIGNIFICANT CHANGE UP (ref 0–0.2)
PROT/CREAT UR-RTO: 0.1 RATIO — SIGNIFICANT CHANGE UP (ref 0–0.2)
PROTHROM AB SERPL-ACNC: 9.7 SEC — SIGNIFICANT CHANGE UP (ref 9.5–13)
PROTHROM AB SERPL-ACNC: 9.7 SEC — SIGNIFICANT CHANGE UP (ref 9.5–13)
RBC # BLD: 3.7 M/UL — LOW (ref 3.8–5.2)
RBC # BLD: 3.7 M/UL — LOW (ref 3.8–5.2)
RBC # FLD: 11.6 % — SIGNIFICANT CHANGE UP (ref 10.3–14.5)
RBC # FLD: 11.6 % — SIGNIFICANT CHANGE UP (ref 10.3–14.5)
RBC CASTS # UR COMP ASSIST: 2 /HPF — SIGNIFICANT CHANGE UP (ref 0–4)
RBC CASTS # UR COMP ASSIST: 2 /HPF — SIGNIFICANT CHANGE UP (ref 0–4)
SODIUM SERPL-SCNC: 134 MMOL/L — LOW (ref 135–145)
SODIUM SERPL-SCNC: 134 MMOL/L — LOW (ref 135–145)
SP GR SPEC: 1.02 — SIGNIFICANT CHANGE UP (ref 1–1.03)
SP GR SPEC: 1.02 — SIGNIFICANT CHANGE UP (ref 1–1.03)
SQUAMOUS # UR AUTO: 2 /HPF — SIGNIFICANT CHANGE UP (ref 0–5)
SQUAMOUS # UR AUTO: 2 /HPF — SIGNIFICANT CHANGE UP (ref 0–5)
T PALLIDUM AB TITR SER: NEGATIVE — SIGNIFICANT CHANGE UP
T PALLIDUM AB TITR SER: NEGATIVE — SIGNIFICANT CHANGE UP
URATE SERPL-MCNC: 3.2 MG/DL — SIGNIFICANT CHANGE UP (ref 2.5–7)
URATE SERPL-MCNC: 3.2 MG/DL — SIGNIFICANT CHANGE UP (ref 2.5–7)
UROBILINOGEN FLD QL: 1 MG/DL — SIGNIFICANT CHANGE UP (ref 0.2–1)
UROBILINOGEN FLD QL: 1 MG/DL — SIGNIFICANT CHANGE UP (ref 0.2–1)
WBC # BLD: 9.75 K/UL — SIGNIFICANT CHANGE UP (ref 3.8–10.5)
WBC # BLD: 9.75 K/UL — SIGNIFICANT CHANGE UP (ref 3.8–10.5)
WBC # FLD AUTO: 9.75 K/UL — SIGNIFICANT CHANGE UP (ref 3.8–10.5)
WBC # FLD AUTO: 9.75 K/UL — SIGNIFICANT CHANGE UP (ref 3.8–10.5)
WBC UR QL: 5 /HPF — SIGNIFICANT CHANGE UP (ref 0–5)
WBC UR QL: 5 /HPF — SIGNIFICANT CHANGE UP (ref 0–5)

## 2023-11-04 RX ORDER — CITRIC ACID/SODIUM CITRATE 300-500 MG
15 SOLUTION, ORAL ORAL EVERY 6 HOURS
Refills: 0 | Status: DISCONTINUED | OUTPATIENT
Start: 2023-11-04 | End: 2023-11-04

## 2023-11-04 RX ORDER — DIBUCAINE 1 %
1 OINTMENT (GRAM) RECTAL EVERY 6 HOURS
Refills: 0 | Status: DISCONTINUED | OUTPATIENT
Start: 2023-11-04 | End: 2023-11-06

## 2023-11-04 RX ORDER — ALBUTEROL 90 UG/1
2 AEROSOL, METERED ORAL EVERY 6 HOURS
Refills: 0 | Status: DISCONTINUED | OUTPATIENT
Start: 2023-11-04 | End: 2023-11-06

## 2023-11-04 RX ORDER — IBUPROFEN 200 MG
600 TABLET ORAL EVERY 6 HOURS
Refills: 0 | Status: COMPLETED | OUTPATIENT
Start: 2023-11-04 | End: 2024-10-02

## 2023-11-04 RX ORDER — IBUPROFEN 200 MG
600 TABLET ORAL EVERY 6 HOURS
Refills: 0 | Status: DISCONTINUED | OUTPATIENT
Start: 2023-11-04 | End: 2023-11-06

## 2023-11-04 RX ORDER — BENZOCAINE 10 %
1 GEL (GRAM) MUCOUS MEMBRANE EVERY 6 HOURS
Refills: 0 | Status: DISCONTINUED | OUTPATIENT
Start: 2023-11-04 | End: 2023-11-06

## 2023-11-04 RX ORDER — KETOROLAC TROMETHAMINE 30 MG/ML
30 SYRINGE (ML) INJECTION ONCE
Refills: 0 | Status: DISCONTINUED | OUTPATIENT
Start: 2023-11-04 | End: 2023-11-04

## 2023-11-04 RX ORDER — MAGNESIUM HYDROXIDE 400 MG/1
30 TABLET, CHEWABLE ORAL
Refills: 0 | Status: DISCONTINUED | OUTPATIENT
Start: 2023-11-04 | End: 2023-11-06

## 2023-11-04 RX ORDER — SIMETHICONE 80 MG/1
80 TABLET, CHEWABLE ORAL EVERY 4 HOURS
Refills: 0 | Status: DISCONTINUED | OUTPATIENT
Start: 2023-11-04 | End: 2023-11-06

## 2023-11-04 RX ORDER — DIPHENHYDRAMINE HCL 50 MG
25 CAPSULE ORAL EVERY 6 HOURS
Refills: 0 | Status: DISCONTINUED | OUTPATIENT
Start: 2023-11-04 | End: 2023-11-06

## 2023-11-04 RX ORDER — HYDROCORTISONE 1 %
1 OINTMENT (GRAM) TOPICAL EVERY 6 HOURS
Refills: 0 | Status: DISCONTINUED | OUTPATIENT
Start: 2023-11-04 | End: 2023-11-06

## 2023-11-04 RX ORDER — ACETAMINOPHEN 500 MG
975 TABLET ORAL
Refills: 0 | Status: DISCONTINUED | OUTPATIENT
Start: 2023-11-04 | End: 2023-11-06

## 2023-11-04 RX ORDER — OXYTOCIN 10 UNIT/ML
41.67 VIAL (ML) INJECTION
Qty: 20 | Refills: 0 | Status: DISCONTINUED | OUTPATIENT
Start: 2023-11-04 | End: 2023-11-05

## 2023-11-04 RX ORDER — CHLORHEXIDINE GLUCONATE 213 G/1000ML
1 SOLUTION TOPICAL DAILY
Refills: 0 | Status: DISCONTINUED | OUTPATIENT
Start: 2023-11-04 | End: 2023-11-04

## 2023-11-04 RX ORDER — ONDANSETRON 8 MG/1
4 TABLET, FILM COATED ORAL ONCE
Refills: 0 | Status: DISCONTINUED | OUTPATIENT
Start: 2023-11-04 | End: 2023-11-05

## 2023-11-04 RX ORDER — OXYCODONE HYDROCHLORIDE 5 MG/1
5 TABLET ORAL ONCE
Refills: 0 | Status: DISCONTINUED | OUTPATIENT
Start: 2023-11-04 | End: 2023-11-06

## 2023-11-04 RX ORDER — OXYTOCIN 10 UNIT/ML
2 VIAL (ML) INJECTION
Qty: 30 | Refills: 0 | Status: DISCONTINUED | OUTPATIENT
Start: 2023-11-04 | End: 2023-11-05

## 2023-11-04 RX ORDER — OXYTOCIN 10 UNIT/ML
VIAL (ML) INJECTION
Qty: 30 | Refills: 0 | Status: DISCONTINUED | OUTPATIENT
Start: 2023-11-04 | End: 2023-11-04

## 2023-11-04 RX ORDER — OXYTOCIN 10 UNIT/ML
333.33 VIAL (ML) INJECTION
Qty: 20 | Refills: 0 | Status: COMPLETED | OUTPATIENT
Start: 2023-11-04 | End: 2023-11-04

## 2023-11-04 RX ORDER — AER TRAVELER 0.5 G/1
1 SOLUTION RECTAL; TOPICAL EVERY 4 HOURS
Refills: 0 | Status: DISCONTINUED | OUTPATIENT
Start: 2023-11-04 | End: 2023-11-06

## 2023-11-04 RX ORDER — PRAMOXINE HYDROCHLORIDE 150 MG/15G
1 AEROSOL, FOAM RECTAL EVERY 4 HOURS
Refills: 0 | Status: DISCONTINUED | OUTPATIENT
Start: 2023-11-04 | End: 2023-11-06

## 2023-11-04 RX ORDER — SODIUM CHLORIDE 9 MG/ML
1000 INJECTION, SOLUTION INTRAVENOUS
Refills: 0 | Status: DISCONTINUED | OUTPATIENT
Start: 2023-11-04 | End: 2023-11-04

## 2023-11-04 RX ORDER — OXYCODONE HYDROCHLORIDE 5 MG/1
5 TABLET ORAL
Refills: 0 | Status: DISCONTINUED | OUTPATIENT
Start: 2023-11-04 | End: 2023-11-06

## 2023-11-04 RX ORDER — SODIUM CHLORIDE 9 MG/ML
3 INJECTION INTRAMUSCULAR; INTRAVENOUS; SUBCUTANEOUS EVERY 8 HOURS
Refills: 0 | Status: DISCONTINUED | OUTPATIENT
Start: 2023-11-04 | End: 2023-11-06

## 2023-11-04 RX ORDER — TETANUS TOXOID, REDUCED DIPHTHERIA TOXOID AND ACELLULAR PERTUSSIS VACCINE, ADSORBED 5; 2.5; 8; 8; 2.5 [IU]/.5ML; [IU]/.5ML; UG/.5ML; UG/.5ML; UG/.5ML
0.5 SUSPENSION INTRAMUSCULAR ONCE
Refills: 0 | Status: DISCONTINUED | OUTPATIENT
Start: 2023-11-04 | End: 2023-11-06

## 2023-11-04 RX ORDER — LANOLIN
1 OINTMENT (GRAM) TOPICAL EVERY 6 HOURS
Refills: 0 | Status: DISCONTINUED | OUTPATIENT
Start: 2023-11-04 | End: 2023-11-06

## 2023-11-04 RX ADMIN — Medication 1000 MILLIUNIT(S)/MIN: at 20:53

## 2023-11-04 RX ADMIN — SODIUM CHLORIDE 3 MILLILITER(S): 9 INJECTION INTRAMUSCULAR; INTRAVENOUS; SUBCUTANEOUS at 22:00

## 2023-11-04 RX ADMIN — Medication 2 MILLIUNIT(S)/MIN: at 08:24

## 2023-11-04 RX ADMIN — Medication 30 MILLIGRAM(S): at 20:53

## 2023-11-04 RX ADMIN — SODIUM CHLORIDE 125 MILLILITER(S): 9 INJECTION, SOLUTION INTRAVENOUS at 08:28

## 2023-11-04 RX ADMIN — CHLORHEXIDINE GLUCONATE 1 APPLICATION(S): 213 SOLUTION TOPICAL at 01:28

## 2023-11-04 RX ADMIN — SODIUM CHLORIDE 125 MILLILITER(S): 9 INJECTION, SOLUTION INTRAVENOUS at 01:25

## 2023-11-04 NOTE — OB PROVIDER H&P - ASSESSMENT
Assessment  – gHTN. GBS neg.    Plan  1. Admit to LND. Routine Labs. IVF.  2. IOL w/ VC/CB  3. Fetus: cat 1 tracing. VTX. EFW 2600g by sono. Continuous EFM. Sono. No concerns.  4. Prenatal issues: none  5. GBS neg  6. Pain: IV pain meds/epidural PRN    Patient discussed with attending physician,  .    Janelle Mayen MD PGY1 Assessment  – gHTN, WPW. GBS neg.    Plan  1. Admit to LND. Routine Labs. IVF.  2. IOL w/ VC/CB  3. Fetus: cat 1 tracing. VTX. EFW 2600g by sono. Continuous EFM. Sono. No concerns.  4. Prenatal issues:   gHTN - HELLP labs sent, will monitor, pt asymptomatic  WPW - neg cardio work up, follows with cardio with PP f/u  5. GBS neg  6. Pain: IV pain meds/epidural PRN    Patient discussed with attending physician, Dr. Kailash Mayen MD PGY1

## 2023-11-04 NOTE — OB PROVIDER IHI INDUCTION/AUGMENTATION NOTE - LABOR: CERVICAL DILATION
"[facility]  Inpatient Services  Wound and Skin Care Progress Note    Admission Date:  11/10/17  HPI, PMH, SH: Reviewed  Unit where seen by Wound Team:  [id number]    WOUND CONSULT RELATED TO: follow up with DTI found 11/12/17    SUBJECTIVE:  \"The night shift changed the dressing\"    Self Report / Pain Level:  Denies related to wound    OBJECTIVE:   DTI pressure injury found by LPS team 11/12/17 due to ACE wrap applied by ortho MD    WOUND TYPE, LOCATION, CHARACTERISTICS (Pressure ulcers: location, stage, POA or date identified)    Wound Type/Location: DTI pressure injury lateral right foot 11/12/17   Periwound:  intact    Drainage:   none    Tissue Type and %:  Purple 100% both    Wound Edges:  attached    Odor:  none     Exposed structure(s):  none   S&S of Infection:  none    Measurements:  (taken 11/16/17)   Proximal        Distal     Length:                        4.5cm            1.3cm   Width:               0.8cm            0.5cm   Depth:               DTI both wounds   Tracts/undermining:  None either wound     INTERVENTIONS BY WOUND TEAM:  Unwrapped foot of ACE wrap and roll gauze.  Dressing kept intact over great toe amputation site.  Measurements and photo taken lateral right foot.  Dorsum injury resolved.  Roll gauze and ACE wrap replaced.      Interdisciplinary consultation:  Staff RN, patient    EVALUATION:   Stable DTI lateral right foot that will be kept intact and resolve without incident    Factors affecting wound healing:  Pressure  Goals: wounds to remain stable and dry    NURSING PLAN OF CARE ORDERS (X):    Dressing changes: See Dressing Maintenance orders:   Skin care: See Skin Care orders:   Rectal tube care: See Rectal Tube Care orders:    Other orders:    WOUND TEAM PLAN OF CARE (X):    NPWT change 3 x week:         Dressing changes by wound team:       Follow up as needed:   X     Other (explain):    Anticipated discharge plans (X):  SNF:           Home Care:           Outpatient " Wound Center:            Self Care:            Other:    TBD     1-1.9 cm

## 2023-11-04 NOTE — OB PROVIDER H&P - ATTENDING COMMENTS
27 yo  @37+1 admitted for Induction of labor due to Gestational Hypertension- gHTN. admit for cervical balloon and Vaginal Cytotec 29 yo  @37+1 admitted for Induction of labor due to Gestational Hypertension- gHTN. admit for cervical balloon and Vaginal Cytotec

## 2023-11-04 NOTE — OB PROVIDER H&P - NSWIC_OBGYN_ALL_OB
No Xelaliciaz Pregnancy And Lactation Text: This medication is Pregnancy Category D and is not considered safe during pregnancy.  The risk during breast feeding is also uncertain.

## 2023-11-04 NOTE — OB PROVIDER H&P - NSHPPHYSICALEXAM_GEN_ALL_CORE
Objective  – Vital Signs  BP:   HR:   Temp:   – PE:   CV: RRR  Pulm: breathing comfortably on RA  Abd: gravid, nontender  Extr: moving all extremities with ease  – FS:   – Spec: pooling, nitrazine, ferning, bleeding,  (lesions if patient with HSV2 history)  – VE: //  – FHT: baseline 1, mod variability, +accels, -decels  – [place]: qmin  – EFW: 2600g by sono  – Sono: vertex Objective  – Vital Signs  BP:   HR:   Temp:   – PE:   CV: RRR  Pulm: breathing comfortably on RA  Abd: gravid, nontender  Extr: moving all extremities with ease  – FS:   – Spec: pooling, nitrazine, ferning, bleeding,  (lesions if patient with HSV2 history)  – VE: //  – FHT: baseline 1, mod variability, +accels, -decels  – [place]: qmin  – EFW: 2600g by sono  – Sono: vertex Objective  – Vital Signs  BP:   HR:   Temp:   – PE:   CV: RRR  Pulm: breathing comfortably on RA  Abd: gravid, nontender  Extr: moving all extremities with ease  – FS:   – Spec: pooling, nitrazine, ferning, bleeding,  (lesions if patient with HSV2 history)  – VE: //  – FHT: baseline 1, mod variability, +accels, -decels  – [place]: qmin  – EFW: 2600g by sono  – Sono: vertex Objective  – Vital Signs  BP: 120s/60s  HR: 80s  Temp: AF    – PE:   CV: RRR  Pulm: breathing comfortably on RA  Abd: gravid, nontender  Extr: moving all extremities with ease    – VE: 1/50/-3  – FHT: baseline 140 bpm, mod variability, +accels, -decels  – [place]: ctx irregular  – EFW: 2600g by sono  – Sono: vertex Objective  – Vital Signs  BP: 120s/60s  HR: 80s  Temp: AF    – PE:   CV: RRR  Pulm: breathing comfortably on RA  Abd: gravid, nontender  Extr: moving all extremities with ease    – VE: 1/50/-3  – FHT: baseline 140 bpm, mod variability, +accels, -decels  – [place]: ctx irregular  – EFW: 2600g by sono  – Sono: vertex Objective  – Vital Signs  BP: 120s/60s  HR: 80s  Temp: AF    – PE:   CV: RRR  Pulm: breathing comfortably on RA  Abd: gravid, nontender  Extr: moving all extremities with ease    – VE: 1/50/-3  – FHT: baseline 140 bpm, mod variability, +accels, -decels  – [place]: ctx irregular  – EFW: 2600g by sono  – Sono: vertex

## 2023-11-04 NOTE — OB PROVIDER DELIVERY SUMMARY - NSLOWPPHRISK_OBGYN_A_OB
No previous uterine incision/Live Pregnancy/Less than or equal to 4 previous vaginal births/No known bleeding disorder/No history of postpartum hemorrhage/No other PPH risks indicated

## 2023-11-04 NOTE — CHART NOTE - NSCHARTNOTEFT_GEN_A_CORE
Attending Note     Evaluated pt for fetal heart rate decelerations   Cervical balloon partially in cervix  Balloon removed  SVE 3/60/-3  FHTs 150 mod yue variable decles  TOCO q 1 min   will respoisition   if continues to contract q 1 minute after cervical balloon   will rupture place iupc and terbutaline if necessary     MITRA Ponce MD
Attending Note     Pt comfortable after epidrual     AFVSS  Gen in NAD   Abd soft, gravid  Ext: no c/c/e     SVE balloon in cervix   FHTS 150 mod yue no decels   TOCO q 2-3 min     A/P: nullip at 37 weeks IOL for GHTN   will start pitocin jasper Ponce MD
Attending Note     Pt feeling vaginal pressure     AFVSS  Gen in NAD   Abd soft, gravid  Ext: no c/c/e     SVE 5/90/0   FHTs 150 mod, had decelerations when on back, repositioned twice and now on left alteral   FHTS 150 mod yue no decels no accoes  IUCP q 2-4 min 200 MU/min     A/P: P0 at 37 weeks IOL   transitioning into active labor   will restart noemy Ponce MD
Attending Note     Pt seen and examined   breathing thru the contractions     SVE cervical balloon in place   FHTS 145 mod yue no decels + accles  TOCO q 2-4 min     A/P: nullip at term IOL for gHTN   morales tart pitocin   epidural prn   peanut ball     MITRA Ponce MD
ATtending Note     Pt seen and examined     AFVSS  Gen in NAD   Abd soft, gravid  Ext: noc/c/e     SVE 3/60/-3, AROMed for clear fluid and pitcoin   FHTs 150 mod no decels no accels   IUPC q 2--5 min < 200 Mu/min     A/P: P0 at 37 weeks IOL for gHTN   reposition and monitor ctx pattern   if continuues to space out will start pitocin

## 2023-11-04 NOTE — OB PROVIDER LABOR PROGRESS NOTE - ASSESSMENT
Cervical balloon instilled with 60/60 cc of normal saline. Placed without complication. Pt tolerated the procedure well.   VC placed     DW: Dr. Kailash Guerra, PGY-3

## 2023-11-04 NOTE — OB RN DELIVERY SUMMARY - NS_SEPSISRSKCALC_OBGYN_ALL_OB_FT
EOS calculated successfully. EOS Risk Factor: 0.5/1000 live births ([facility] national incidence); GA=37w1d; Temp=98.24; ROM=6.55; GBS='Negative'; Antibiotics='No antibiotics or any antibiotics < 2 hrs prior to birth'

## 2023-11-04 NOTE — OB PROVIDER DELIVERY SUMMARY - NSPROVIDERDELIVERYNOTE_OBGYN_ALL_OB_FT
Attending Note     Pt progressed to 10/100/+2   Delivered vertex over intact perineum followed by the shoulders and body   Nuchal x 2 and body cord  Delayed cord clamping performed  Placenta delivered intact  Uterine cavity noted to be empty   Vagina, rectum and cervix inspected  1st degree and right labial laceration noted  Rectum intact after delivery   Girl

## 2023-11-04 NOTE — OB RN PATIENT PROFILE - NSSDOHFOODOUT_OBGYN_A_OB
Detail Level: Generalized
Birth Control Pills Counseling: Birth Control Pill Counseling: I discussed with the patient the potential side effects of OCPs including but not limited to increased risk of stroke, heart attack, thrombophlebitis, deep venous thrombosis, hepatic adenomas, breast changes, GI upset, headaches, and depression.  The patient verbalized understanding of the proper use and possible adverse effects of OCPs. All of the patient's questions and concerns were addressed.
Benzoyl Peroxide Pregnancy And Lactation Text: This medication is Pregnancy Category C. It is unknown if benzoyl peroxide is excreted in breast milk.
Doxycycline Pregnancy And Lactation Text: This medication is Pregnancy Category D and not consider safe during pregnancy. It is also excreted in breast milk but is considered safe for shorter treatment courses.
High Dose Vitamin A Counseling: Side effects reviewed, pt to contact office should one occur.
Topical Clindamycin Counseling: Patient counseled that this medication may cause skin irritation or allergic reactions.  In the event of skin irritation, the patient was advised to reduce the amount of the drug applied or use it less frequently.   The patient verbalized understanding of the proper use and possible adverse effects of clindamycin.  All of the patient's questions and concerns were addressed.
Tazorac Pregnancy And Lactation Text: This medication is not safe during pregnancy. It is unknown if this medication is excreted in breast milk.
Isotretinoin Pregnancy And Lactation Text: This medication is Pregnancy Category X and is considered extremely dangerous during pregnancy. It is unknown if it is excreted in breast milk.
Spironolactone Counseling: Patient advised regarding risks of diarrhea, abdominal pain, hyperkalemia, birth defects (for female patients), liver toxicity and renal toxicity. The patient may need blood work to monitor liver and kidney function and potassium levels while on therapy. The patient verbalized understanding of the proper use and possible adverse effects of spironolactone.  All of the patient's questions and concerns were addressed.
Birth Control Pills Pregnancy And Lactation Text: This medication should be avoided if pregnant and for the first 30 days post-partum.
Erythromycin Counseling:  I discussed with the patient the risks of erythromycin including but not limited to GI upset, allergic reaction, drug rash, diarrhea, increase in liver enzymes, and yeast infections.
Topical Retinoid counseling:  Patient advised to apply a pea-sized amount only at bedtime and wait 30 minutes after washing their face before applying.  If too drying, patient may add a non-comedogenic moisturizer. The patient verbalized understanding of the proper use and possible adverse effects of retinoids.  All of the patient's questions and concerns were addressed.
High Dose Vitamin A Pregnancy And Lactation Text: High dose vitamin A therapy is contraindicated during pregnancy and breast feeding.
Topical Clindamycin Pregnancy And Lactation Text: This medication is Pregnancy Category B and is considered safe during pregnancy. It is unknown if it is excreted in breast milk.
Include Pregnancy/Lactation Warning?: No
Spironolactone Pregnancy And Lactation Text: This medication can cause feminization of the male fetus and should be avoided during pregnancy. The active metabolite is also found in breast milk.
Azithromycin Counseling:  I discussed with the patient the risks of azithromycin including but not limited to GI upset, allergic reaction, drug rash, diarrhea, and yeast infections.
Minocycline Counseling: Patient advised regarding possible photosensitivity and discoloration of the teeth, skin, lips, tongue and gums.  Patient instructed to avoid sunlight, if possible.  When exposed to sunlight, patients should wear protective clothing, sunglasses, and sunscreen.  The patient was instructed to call the office immediately if the following severe adverse effects occur:  hearing changes, easy bruising/bleeding, severe headache, or vision changes.  The patient verbalized understanding of the proper use and possible adverse effects of minocycline.  All of the patient's questions and concerns were addressed.
Topical Sulfur Applications Counseling: Topical Sulfur Counseling: Patient counseled that this medication may cause skin irritation or allergic reactions.  In the event of skin irritation, the patient was advised to reduce the amount of the drug applied or use it less frequently.   The patient verbalized understanding of the proper use and possible adverse effects of topical sulfur application.  All of the patient's questions and concerns were addressed.
Detail Level: Zone
Azithromycin Pregnancy And Lactation Text: This medication is considered safe during pregnancy and is also secreted in breast milk.
Dapsone Counseling: I discussed with the patient the risks of dapsone including but not limited to hemolytic anemia, agranulocytosis, rashes, methemoglobinemia, kidney failure, peripheral neuropathy, headaches, GI upset, and liver toxicity.  Patients who start dapsone require monitoring including baseline LFTs and weekly CBCs for the first month, then every month thereafter.  The patient verbalized understanding of the proper use and possible adverse effects of dapsone.  All of the patient's questions and concerns were addressed.
Tetracycline Counseling: Patient counseled regarding possible photosensitivity and increased risk for sunburn.  Patient instructed to avoid sunlight, if possible.  When exposed to sunlight, patients should wear protective clothing, sunglasses, and sunscreen.  The patient was instructed to call the office immediately if the following severe adverse effects occur:  hearing changes, easy bruising/bleeding, severe headache, or vision changes.  The patient verbalized understanding of the proper use and possible adverse effects of tetracycline.  All of the patient's questions and concerns were addressed. Patient understands to avoid pregnancy while on therapy due to potential birth defects.
Topical Retinoid Pregnancy And Lactation Text: This medication is Pregnancy Category C. It is unknown if this medication is excreted in breast milk.
Erythromycin Pregnancy And Lactation Text: This medication is Pregnancy Category B and is considered safe during pregnancy. It is also excreted in breast milk.
Topical Sulfur Applications Pregnancy And Lactation Text: This medication is Pregnancy Category C and has an unknown safety profile during pregnancy. It is unknown if this topical medication is excreted in breast milk.
Bactrim Pregnancy And Lactation Text: This medication is Pregnancy Category D and is known to cause fetal risk.  It is also excreted in breast milk.
Doxycycline Counseling:  Patient counseled regarding possible photosensitivity and increased risk for sunburn.  Patient instructed to avoid sunlight, if possible.  When exposed to sunlight, patients should wear protective clothing, sunglasses, and sunscreen.  The patient was instructed to call the office immediately if the following severe adverse effects occur:  hearing changes, easy bruising/bleeding, severe headache, or vision changes.  The patient verbalized understanding of the proper use and possible adverse effects of doxycycline.  All of the patient's questions and concerns were addressed.
Benzoyl Peroxide Counseling: Patient counseled that medicine may cause skin irritation and bleach clothing.  In the event of skin irritation, the patient was advised to reduce the amount of the drug applied or use it less frequently.   The patient verbalized understanding of the proper use and possible adverse effects of benzoyl peroxide.  All of the patient's questions and concerns were addressed.
Bactrim Counseling:  I discussed with the patient the risks of sulfa antibiotics including but not limited to GI upset, allergic reaction, drug rash, diarrhea, dizziness, photosensitivity, and yeast infections.  Rarely, more serious reactions can occur including but not limited to aplastic anemia, agranulocytosis, methemoglobinemia, blood dyscrasias, liver or kidney failure, lung infiltrates or desquamative/blistering drug rashes.
Tetracycline Pregnancy And Lactation Text: This medication is Pregnancy Category D and not consider safe during pregnancy. It is also excreted in breast milk.
Dapsone Pregnancy And Lactation Text: This medication is Pregnancy Category C and is not considered safe during pregnancy or breast feeding.
Isotretinoin Counseling: Patient should get monthly blood tests, not donate blood, not drive at night if vision affected, not share medication, and not undergo elective surgery for 6 months after tx completed. Side effects reviewed, pt to contact office should one occur.
Tazorac Counseling:  Patient advised that medication is irritating and drying.  Patient may need to apply sparingly and wash off after an hour before eventually leaving it on overnight.  The patient verbalized understanding of the proper use and possible adverse effects of tazorac.  All of the patient's questions and concerns were addressed.
never true

## 2023-11-04 NOTE — OB NEONATOLOGY/PEDIATRICIAN DELIVERY SUMMARY - NSPEDSNEONOTESA_OBGYN_ALL_OB_FT
Pediatrician called to delivery for Cat 2. Female AGA infant born at 37.1  wks via  to a 28 y/o  blood type B+ mother. Maternal history of Asthma on albuterol, Kidney stones and WPW cleared by cardio.  Prenatal labs nr/immune/-, GBS - on 10/24. AROM at 1214 on  with clear fluids. EOS score 0.14, highest maternal temperature 36.8. Baby emerged vigorous, crying. Cord clamping delayed 60 sec. Infant was brought to radiant warmer and warmed, dried, stimulated and suctioned. HR>100, normal respiratory effort. APGARS of 8/9 . Mom is initiating breast feeding. Defers Hepatitis B vaccination.     BW: 2675g  :   TOB: 1847    Physical Exam:  Gen: no acute distress, +grimace  HEENT:  anterior fontanel open soft and flat, nondysmorphic facies, no cleft lip/palate, ears normal set, no ear pits or tags, nares clinically patent  Resp: Normal respiratory effort without grunting or retractions, good air entry b/l, clear to auscultation bilaterally  Cardio: Present S1/S2, regular rate and rhythm, no murmurs  Abd: soft, non tender, non distended, umbilical cord with 3 vessels  Neuro: +palmar and plantar grasp, +suck, +candida, normal tone  Extremities: negative stanley and ortolani maneuvers, moving all extremities, no clavicular crepitus or stepoff  Skin: pink, warm  Genitals: Normal female anatomy, Jonathan 1, anus patent

## 2023-11-04 NOTE — OB PROVIDER H&P - HISTORY OF PRESENT ILLNESS
28 yoF GP @37+0 wga presents for IOL due to gHTN. +FM. -LOF. -CTXs. -VB. Pt denies any other concerns.  Denies HA, vision changes, RUQ pain.    – PNC: gHTN. GBS neg.  EFW 2600g by john.  – OBHx:   – GynHx: denies  – PMH: denies  – PSH: denies  – Psych: denies   – Social: denies   – Meds: PNV   – Allergies: NKDA  – Will accept blood transfusions? Yes     28 yoF  @37+1 wga presents for IOL due to gHTN. +FM. -LOF. -CTXs. -VB. Pt denies any other concerns.  Denies HA, vision changes, RUQ pain.    – PNC: gHTN. GBS neg.  EFW 2600g by john.  gHTN - diagnosed 3-4 weeks ago, BPs 110-140s/70-80s at home. P/C and 24 hr urine protein neg per pt.  WPW - diagnosed 5 years ago without any issues until 1st tri of pregnancy with 2 syncopal episodes. Halter monitor neg with shortened ME interval. EKG done neg. Follows with cardio and repeat EKG in third tri neg. No recs during labor and pt will f/u PP  – OBHx: denies  – GynHx: denies  – PMH: asthma (no hospitalizations)  – PSH: denies  – Psych: denies   – Social: denies   – Meds: PNV, albuterol PRN (use once a week)  – Allergies: NKDA  – Will accept blood transfusions? Yes     28 yoF  @37+1 wga presents for IOL due to gHTN. +FM. -LOF. -CTXs. -VB. Pt denies any other concerns.  Denies HA, vision changes, RUQ pain.    – PNC: gHTN. GBS neg.  EFW 2600g by john.  gHTN - diagnosed 3-4 weeks ago, BPs 110-140s/70-80s at home. P/C and 24 hr urine protein neg per pt.  WPW - diagnosed 5 years ago without any issues until 1st tri of pregnancy with 2 syncopal episodes. Halter monitor neg with shortened AL interval. EKG done neg. Follows with cardio and repeat EKG in third tri neg. No recs during labor and pt will f/u PP  – OBHx: denies  – GynHx: denies  – PMH: asthma (no hospitalizations)  – PSH: denies  – Psych: denies   – Social: denies   – Meds: PNV, albuterol PRN (use once a week)  – Allergies: NKDA  – Will accept blood transfusions? Yes     28 yoF  @37+1 wga presents for IOL due to gHTN. +FM. -LOF. -CTXs. -VB. Pt denies any other concerns.  Denies HA, vision changes, RUQ pain.    – PNC: gHTN. GBS neg.  EFW 2600g by john.  gHTN - diagnosed 3-4 weeks ago, BPs 110-140s/70-80s at home. P/C and 24 hr urine protein neg per pt.  WPW - diagnosed 5 years ago without any issues until 1st tri of pregnancy with 2 syncopal episodes. Halter monitor neg with shortened WI interval. EKG done neg. Follows with cardio and repeat EKG in third tri neg. No recs during labor and pt will f/u PP  – OBHx: denies  – GynHx: denies  – PMH: asthma (no hospitalizations)  – PSH: denies  – Psych: denies   – Social: denies   – Meds: PNV, albuterol PRN (use once a week)  – Allergies: NKDA  – Will accept blood transfusions? Yes     28 yoF  @37+1 wga presents for IOL due to gHTN. +FM. -LOF. -CTXs. -VB. Pt denies any other concerns.  Denies HA, vision changes, RUQ pain.    – PNC: gHTN. GBS neg.  EFW 2600g by john.  gHTN - diagnosed 3-4 weeks ago, BPs 110-140s/70-80s at home. P/C and 24 hr urine protein neg per pt.  WPW - diagnosed 5 years ago without any issues until 1st tri of pregnancy with 2 syncopal episodes. Halter monitor neg with shortened FL interval. EKG done neg. Follows with cardio (Dr. Saxena) and repeat EKG in third tri neg. No recs during labor and pt will f/u PP  – OBHx: denies  – GynHx: denies  – PMH: asthma (no hospitalizations)  – PSH: denies  – Psych: denies   – Social: denies   – Meds: PNV, albuterol PRN (use once a week)  – Allergies: NKDA  – Will accept blood transfusions? Yes     28 yoF  @37+1 wga presents for IOL due to gHTN. +FM. -LOF. -CTXs. -VB. Pt denies any other concerns.  Denies HA, vision changes, RUQ pain.    – PNC: gHTN. GBS neg.  EFW 2600g by john.  gHTN - diagnosed 3-4 weeks ago, BPs 110-140s/70-80s at home. P/C and 24 hr urine protein neg per pt.  WPW - diagnosed 5 years ago without any issues until 1st tri of pregnancy with 2 syncopal episodes. Halter monitor neg with shortened NE interval. EKG done neg. Follows with cardio (Dr. Saxena) and repeat EKG in third tri neg. No recs during labor and pt will f/u PP  – OBHx: denies  – GynHx: denies  – PMH: asthma (no hospitalizations)  – PSH: denies  – Psych: denies   – Social: denies   – Meds: PNV, albuterol PRN (use once a week)  – Allergies: NKDA  – Will accept blood transfusions? Yes     28 yoF  @37+1 wga presents for IOL due to gHTN. +FM. -LOF. -CTXs. -VB. Pt denies any other concerns.  Denies HA, vision changes, RUQ pain.    – PNC: gHTN. GBS neg.  EFW 2600g by john.  gHTN - diagnosed 3-4 weeks ago, BPs 110-140s/70-80s at home. P/C and 24 hr urine protein neg per pt.  WPW - diagnosed 5 years ago without any issues until 1st tri of pregnancy with 2 syncopal episodes. Halter monitor neg with shortened MS interval. EKG done neg. Follows with cardio (Dr. Saxena) and repeat EKG in third tri neg. No recs during labor and pt will f/u PP  – OBHx: denies  – GynHx: denies  – PMH: asthma (no hospitalizations)  – PSH: denies  – Psych: denies   – Social: denies   – Meds: PNV, albuterol PRN (use once a week)  – Allergies: NKDA  – Will accept blood transfusions? Yes

## 2023-11-04 NOTE — OB RN DELIVERY SUMMARY - NSSELHIDDEN_OBGYN_ALL_OB_FT
[[email or web address]_ALL_OB_FT:[email or web address]],[NS_DeliveryRN_OBGYN_ALL_OB_FT:[id number]]

## 2023-11-04 NOTE — OB RN PATIENT PROFILE - EDUCATION PROVIDED ON ASSESSMENT OF INFANT "FEEDING CUES" AND THE IMPORTANCE OF FEEDING "ON CUE" / "BABY-LED" FEEDINGS
[id number] [facility] [place] [facility] - SELF-TEST Encounter    Patient Findings     Negatives:  Signs/symptoms of thrombosis, Signs/symptoms of bleeding, Laboratory test error suspected, Change in health, Change in alcohol use, Change in activity, Upcoming invasive procedure, Emergency department visit, Upcoming dental procedure, Missed doses, Extra doses, Change in medications, Change in diet/appetite, Hospital admission, Bruising, Other complaints    Comments:  April 6TH - SECOND COVID VACCINE  April 19TH - DR. PATEL           Assessment/Plan:  Patient is on warfarin for atrial fibrillation. INR is SUBtherapeutic at 1.8, goal is 2-3. INR'S HAVE BEEN FLUCTUATING; LAST INR WAS 3.2 ON SAME REGIMEN. Warfarin Dosing Orders: 15 MG TODAY THEN RESUME USUAL REGIMEN OF 15 MG EVERY THUR.; 10 MG ALL OTHER DAYS    Warfarin Dosing Orders Provided To: patient     Self-Test Follow Up: 2 weeks on 4/5    Next [facility] Scheduled Visit: due in April; will schedule at least 2 weeks after second COVID vaccine    Patient understands dosing directions and information discussed. Dosing schedule and follow up appointment given to patient. Patient acknowledges working in consult agreement with pharmacist as referred by his/her physician. Paul Mora PharmD 3/22/2021 2:52 PM      CLINICAL PHARMACY CONSULT: MED RECONCILIATION/REVIEW ADDENDUM    For Pharmacy Admin Tracking Only    PHSO: No  Total # of Interventions Recommended: 1  - Increased Dose #: 1  - Maintenance Safety Lab Monitoring #: 1  Recommended intervention potential cost savings:   Accepted intervention potential cost savings:    Total Interventions Accepted: 1  Time Spent (min): 323 [address], PharmD Statement Selected

## 2023-11-05 ENCOUNTER — TRANSCRIPTION ENCOUNTER (OUTPATIENT)
Age: 28
End: 2023-11-05

## 2023-11-05 RX ORDER — IBUPROFEN 200 MG
1 TABLET ORAL
Qty: 0 | Refills: 0 | DISCHARGE
Start: 2023-11-05

## 2023-11-05 RX ORDER — DOCUSATE SODIUM 100 MG
1 CAPSULE ORAL
Refills: 0 | DISCHARGE

## 2023-11-05 RX ORDER — ACETAMINOPHEN 500 MG
3 TABLET ORAL
Qty: 0 | Refills: 0 | DISCHARGE
Start: 2023-11-05

## 2023-11-05 RX ORDER — ALBUTEROL 90 UG/1
2 AEROSOL, METERED ORAL
Refills: 0 | DISCHARGE

## 2023-11-05 RX ADMIN — Medication 975 MILLIGRAM(S): at 12:30

## 2023-11-05 RX ADMIN — SODIUM CHLORIDE 3 MILLILITER(S): 9 INJECTION INTRAMUSCULAR; INTRAVENOUS; SUBCUTANEOUS at 16:50

## 2023-11-05 RX ADMIN — Medication 600 MILLIGRAM(S): at 16:00

## 2023-11-05 RX ADMIN — Medication 975 MILLIGRAM(S): at 11:32

## 2023-11-05 RX ADMIN — Medication 975 MILLIGRAM(S): at 06:04

## 2023-11-05 RX ADMIN — Medication 975 MILLIGRAM(S): at 00:19

## 2023-11-05 RX ADMIN — Medication 600 MILLIGRAM(S): at 03:56

## 2023-11-05 RX ADMIN — Medication 975 MILLIGRAM(S): at 01:00

## 2023-11-05 RX ADMIN — Medication 600 MILLIGRAM(S): at 22:05

## 2023-11-05 RX ADMIN — Medication 600 MILLIGRAM(S): at 04:30

## 2023-11-05 RX ADMIN — Medication 975 MILLIGRAM(S): at 05:10

## 2023-11-05 RX ADMIN — Medication 975 MILLIGRAM(S): at 18:00

## 2023-11-05 RX ADMIN — Medication 1 TABLET(S): at 11:32

## 2023-11-05 RX ADMIN — SODIUM CHLORIDE 3 MILLILITER(S): 9 INJECTION INTRAMUSCULAR; INTRAVENOUS; SUBCUTANEOUS at 05:01

## 2023-11-05 RX ADMIN — Medication 600 MILLIGRAM(S): at 09:30

## 2023-11-05 RX ADMIN — Medication 600 MILLIGRAM(S): at 15:15

## 2023-11-05 RX ADMIN — Medication 600 MILLIGRAM(S): at 08:39

## 2023-11-05 RX ADMIN — Medication 975 MILLIGRAM(S): at 19:00

## 2023-11-05 RX ADMIN — Medication 600 MILLIGRAM(S): at 21:35

## 2023-11-05 NOTE — DISCHARGE NOTE OB - MEDICATION SUMMARY - MEDICATIONS TO TAKE
I will START or STAY ON the medications listed below when I get home from the hospital:    ibuprofen 600 mg oral tablet  -- 1 tab(s) by mouth every 6 hours  -- Indication: For for pain    acetaminophen 325 mg oral tablet  -- 3 tab(s) by mouth every 8 hours  -- Indication: For for pain    PNV Prenatal oral tablet  -- 1 by mouth once a day  -- Indication: For for vitamins

## 2023-11-05 NOTE — DISCHARGE NOTE OB - NS MD DC FALL RISK RISK
For information on Fall & Injury Prevention, visit: [email or web address] OR  [email or web address] OR  https://www.cdc.gov/steadi/patient.html

## 2023-11-05 NOTE — DISCHARGE NOTE OB - PLAN OF CARE
nothing in the vagina x 6 weeks please take your blood pressure three times per day   please call if BP > 140/90, headache, vision changes, right upper quadrant pain or epigastric pain After discharge, please stay on pelvic rest for 6 weeks, meaning no sexual intercourse, no tampons and no douching.  No driving for 2 weeks.  No lifting objects heavier than baby for 2 weeks.  Expect to have vaginal bleeding/spotting for up to six weeks.  The bleeding should get lighter and more white/light brown with time.  For bleeding soaking more than a pad an hour or passing clots greater than the size of your fist, come in to the   emergency department.  Follow up in the office in 6 weeks -Has BP Cuff at home  -instructions given on BP monitoring; TID; call MD office if greater than 140/90; report to emergency room is greater than 160/110  -reviewed signs and symptoms related to preeclampsia with patient such as HA, Change in vision, N/V. RUQ pain   -keep log BP's to present to MD during appointment for BP check in 3 to 5 days  -All questions answered, patient verbalized understanding.

## 2023-11-05 NOTE — DISCHARGE NOTE OB - CARE PROVIDER_API CALL
Rachel Ponce  Obstetrics and Gynecology  [address] [facility], [address]  [place] [address]  Phone: [phone]  Fax: [phone]  Follow Up Time:

## 2023-11-05 NOTE — DISCHARGE NOTE OB - NSDCQMCOGNITION_NEU_ALL_CORE
Patient is in the office today for a 6 month follow up. 1. Have you been to the ER, urgent care clinic since your last visit? Hospitalized since your last visit? No    2. Have you seen or consulted any other health care providers outside of the [address] [place] [address] since your last visit? Include any pap smears or colon screening.  No No difficulties

## 2023-11-05 NOTE — LACTATION INITIAL EVALUATION - LACTATION INTERVENTIONS
Primary RN made aware of consult done./initiate/review safe skin-to-skin/initiate/review hand expression/initiate/review pumping guidelines and safe milk handling/initiate/review techniques for position and latch/review techniques to manage sore nipples/engorgement/initiate/review breast massage/compression/reviewed components of an effective feeding and at least 8 effective feedings per day required/reviewed importance of monitoring infant diapers, the breastfeeding log, and minimum output each day/reviewed risks of artificial nipples/reviewed benefits and recommendations for rooming in/reviewed feeding on demand/by cue at least 8 times a day

## 2023-11-05 NOTE — LACTATION INITIAL EVALUATION - POTENTIAL FOR
ineffective breastfeeding/sore breast/s/sore nipples/engorgement/knowledge deficit/plugged ducts/latch on difficulty/delayed secretory activation

## 2023-11-05 NOTE — DISCHARGE NOTE OB - PATIENT PORTAL LINK FT
You can access the FollowMyHealth Patient Portal offered by [facility] by registering at the following website: [email or web address]. By joining [facility]’s FollowMyHealth portal, you will also be able to view your health information using other applications (apps) compatible with our system.

## 2023-11-05 NOTE — DISCHARGE NOTE OB - CARE PLAN
1 Principal Discharge DX:	Normal vaginal delivery  Assessment and plan of treatment:	nothing in the vagina x 6 weeks  Secondary Diagnosis:	Gestational hypertension, third trimester  Assessment and plan of treatment:	please take your blood pressure three times per day   please call if BP > 140/90, headache, vision changes, right upper quadrant pain or epigastric pain   Principal Discharge DX:	Normal vaginal delivery  Assessment and plan of treatment:	After discharge, please stay on pelvic rest for 6 weeks, meaning no sexual intercourse, no tampons and no douching.  No driving for 2 weeks.  No lifting objects heavier than baby for 2 weeks.  Expect to have vaginal bleeding/spotting for up to six weeks.  The bleeding should get lighter and more white/light brown with time.  For bleeding soaking more than a pad an hour or passing clots greater than the size of your fist, come in to the   emergency department.  Follow up in the office in 6 weeks  Secondary Diagnosis:	Gestational hypertension, third trimester  Assessment and plan of treatment:	-Has BP Cuff at home  -instructions given on BP monitoring; TID; call MD office if greater than 140/90; report to emergency room is greater than 160/110  -reviewed signs and symptoms related to preeclampsia with patient such as HA, Change in vision, N/V. RUQ pain   -keep log BP's to present to MD during appointment for BP check in 3 to 5 days  -All questions answered, patient verbalized understanding.

## 2023-11-06 VITALS
SYSTOLIC BLOOD PRESSURE: 118 MMHG | DIASTOLIC BLOOD PRESSURE: 68 MMHG | OXYGEN SATURATION: 99 % | HEART RATE: 76 BPM | TEMPERATURE: 98 F | RESPIRATION RATE: 17 BRPM

## 2023-11-06 RX ADMIN — Medication 975 MILLIGRAM(S): at 00:35

## 2023-11-06 RX ADMIN — Medication 600 MILLIGRAM(S): at 06:13

## 2023-11-06 RX ADMIN — Medication 975 MILLIGRAM(S): at 00:03

## 2023-11-06 NOTE — PROGRESS NOTE ADULT - SUBJECTIVE AND OBJECTIVE BOX
Attending Note  admission for IOL due to Ghtn   patient c/o uncomfortable due to the balloon partly out of vagina       Vital Signs Last 24 Hrs  T(C): 36.7 (04 Nov 2023 05:00), Max: 36.8 (04 Nov 2023 01:00)  T(F): 98.06 (04 Nov 2023 05:00), Max: 98.24 (04 Nov 2023 01:00)  HR: 88 (04 Nov 2023 07:29) (74 - 97)  BP: 135/83 (04 Nov 2023 07:29) (109/56 - 139/81)  BP(mean): --  RR: 14 (04 Nov 2023 05:00) (14 - 14)  SpO2: --        FETAL HEART RATE: category 1 FHRT     [place]: 1-4 min apart s/p vag-Cytotec     CERVICAL EXAM: 1/70 /vtx /-2     Assessment/ plan     IOL   category 1 FHRT   plan replaced cervical valverde balloon with new one and 80 cc at each balloon   continue IOL     C Kailash   
S: 29yo PPD#2 s/p  with 1st degree and right labial lacerations. .  Pregnancy and post-partum periods c/b gHTN; PCR 0.1, HELLP wnl. Blood pressures are stable. Patient is maintained on no medication. Patient feels well. Pain is well controlled. She is tolerating a regular diet and passing flatus. She is voiding spontaneously, and ambulating without difficulty. Denies CP/SOB. Denies lightheadedness/dizziness. Denies N/V.    O:  Vitals:   Vital Signs Last 24 Hrs  T(C): 36.7 (2023 06:08), Max: 37 (2023 09:59)  T(F): 98.1 (2023 06:08), Max: 98.6 (2023 09:59)  HR: 81 (2023 06:08) (76 - 81)  BP: 121/63 (2023 06:08) (121/63 - 140/75)  BP(mean): --  RR: 18 (2023 06:08) (17 - 18)  SpO2: 99% (2023 06:08) (98% - 100%)    Parameters below as of 2023 06:08  Patient On (Oxygen Delivery Method): room air        MEDICATIONS  (STANDING):  acetaminophen     Tablet .. 975 milliGRAM(s) Oral <User Schedule>  diphtheria/tetanus/pertussis (acellular) Vaccine (Adacel) 0.5 milliLiter(s) IntraMuscular once  ibuprofen  Tablet. 600 milliGRAM(s) Oral every 6 hours  prenatal multivitamin 1 Tablet(s) Oral daily  sodium chloride 0.9% lock flush 3 milliLiter(s) IV Push every 8 hours    MEDICATIONS  (PRN):  albuterol    90 MICROgram(s) HFA Inhaler 2 Puff(s) Inhalation every 6 hours PRN Shortness of Breath and/or Wheezing  benzocaine 20%/menthol 0.5% Spray 1 Spray(s) Topical every 6 hours PRN for Perineal discomfort  dibucaine 1% Ointment 1 Application(s) Topical every 6 hours PRN Perineal discomfort  diphenhydrAMINE 25 milliGRAM(s) Oral every 6 hours PRN Pruritus  hydrocortisone 1% Cream 1 Application(s) Topical every 6 hours PRN Moderate Pain (4-6)  lanolin Ointment 1 Application(s) Topical every 6 hours PRN nipple soreness  magnesium hydroxide Suspension 30 milliLiter(s) Oral two times a day PRN Constipation  oxyCODONE    IR 5 milliGRAM(s) Oral every 3 hours PRN Moderate to Severe Pain (4-10)  oxyCODONE    IR 5 milliGRAM(s) Oral once PRN Moderate to Severe Pain (4-10)  pramoxine 1%/zinc 5% Cream 1 Application(s) Topical every 4 hours PRN Moderate Pain (4-6)  simethicone 80 milliGRAM(s) Chew every 4 hours PRN Gas  witch hazel Pads 1 Application(s) Topical every 4 hours PRN Perineal discomfort      Labs:  Blood type: B Positive  Rubella IgG: RPR: Negative                          12.0   9.75 >-----------< 177    (  @ 00:15 )             33.3<L>    23 @ 00:15      134<L>  |  101  |  13  ----------------------------<  79  3.8   |  20<L>  |  0.54        Ca    9.1      2023 00:15    TPro  6.1  /  Alb  3.3  /  TBili  0.2  /  DBili  x   /  AST  23  /  ALT  12  /  AlkPhos  124<H>  23 @ 00:15      Physical Exam:  General: NAD  Abdomen: soft, non-tender, non-distended, fundus firm  Vaginal: Lochia wnl  Extremities: No erythema/edema    A/P: 29yo GP PPD#2 s/p .  Patient is stable and doing well post-partum.    #gHTN  -Blood pressures stable  -HELLP wnl, PCR 0.1  --Has BP Cuff at home  -instructions given on BP monitoring; TID; call MD office if greater than 140/90; report to emergency room is greater than 160/110  -reviewed signs and symptoms related to preeclampsia with patient such as HA, Change in vision, N/V. RUQ pain   -keep log BP's to present to MD during appointment for BP check in 3 to 5 days  -All questions answered, patient verbalized understanding     #Post-Partum  - Pain well controlled, continue current pain regimen  - Increase ambulation, SCDs when not ambulating  - Continue regular diet  - Discharge plan-For discharge today    BLANCHE Fisher
Attending Note     PPD 1 s/p  c/w gHTN   doing well   no complaints   voiding freely   minimal bleeding   reports no HA/vision changes/RUQ or epigastric pain     Vital Signs Last 24 Hrs  T(C): 36.8 (2023 05:57), Max: 37.2 (2023 01:10)  T(F): 98.3 (2023 05:57), Max: 99 (2023 01:10)  HR: 79 (2023 05:57) (73 - 133)  BP: 121/71 (2023 05:57) (100/54 - 166/93)  BP(mean): --  RR: 18 (2023 05:57) (17 - 18)  SpO2: 98% (2023 05:57) (83% - 100%)    Parameters below as of 2023 05:57  Patient On (Oxygen Delivery Method): room air  Gen in NAD   Abd soft, fundus firm   Perineum healing well   Ext: 1+ edema b/l     A/P PPD 1 s/p  c/wGHTN   bps normotensive and not requiring medications   routine ppc are  d/c home tomorrow   f/u in 1 week for BP check

## 2023-11-07 ENCOUNTER — TRANSCRIPTION ENCOUNTER (OUTPATIENT)
Age: 28
End: 2023-11-07

## 2023-11-07 ENCOUNTER — NON-APPOINTMENT (OUTPATIENT)
Age: 28
End: 2023-11-07

## 2023-11-07 DIAGNOSIS — O13.9 GESTATIONAL [PREGNANCY-INDUCED] HYPERTENSION W/OUT SIGNIFICANT PROTEINURIA, UNSPECIFIED TRIMESTER: ICD-10-CM

## 2023-11-07 DIAGNOSIS — F41.9 ANXIETY DISORDER, UNSPECIFIED: ICD-10-CM

## 2023-11-08 ENCOUNTER — APPOINTMENT (OUTPATIENT)
Age: 28
End: 2023-11-08

## 2023-11-08 ENCOUNTER — APPOINTMENT (OUTPATIENT)
Dept: CARE COORDINATION | Facility: HOME HEALTH | Age: 28
End: 2023-11-08

## 2023-11-08 DIAGNOSIS — Z87.09 PERSONAL HISTORY OF OTHER DISEASES OF THE RESPIRATORY SYSTEM: ICD-10-CM

## 2023-11-08 DIAGNOSIS — Z86.59 PERSONAL HISTORY OF OTHER MENTAL AND BEHAVIORAL DISORDERS: ICD-10-CM

## 2023-11-12 ENCOUNTER — RESULT REVIEW (OUTPATIENT)
Age: 28
End: 2023-11-12

## 2023-11-12 ENCOUNTER — OUTPATIENT (OUTPATIENT)
Dept: INPATIENT UNIT | Facility: HOSPITAL | Age: 28
LOS: 1 days | Discharge: HOME | End: 2023-11-12
Payer: COMMERCIAL

## 2023-11-12 DIAGNOSIS — O26.899 OTHER SPECIFIED PREGNANCY RELATED CONDITIONS, UNSPECIFIED TRIMESTER: ICD-10-CM

## 2023-11-12 LAB
ALBUMIN SERPL ELPH-MCNC: 3.8 G/DL — SIGNIFICANT CHANGE UP (ref 3.3–5)
ALBUMIN SERPL ELPH-MCNC: 3.8 G/DL — SIGNIFICANT CHANGE UP (ref 3.3–5)
ALP SERPL-CCNC: 100 U/L — SIGNIFICANT CHANGE UP (ref 40–120)
ALP SERPL-CCNC: 100 U/L — SIGNIFICANT CHANGE UP (ref 40–120)
ALT FLD-CCNC: 12 U/L — SIGNIFICANT CHANGE UP (ref 4–33)
ALT FLD-CCNC: 12 U/L — SIGNIFICANT CHANGE UP (ref 4–33)
AMYLASE P1 CFR SERPL: 42 U/L — SIGNIFICANT CHANGE UP (ref 25–125)
AMYLASE P1 CFR SERPL: 42 U/L — SIGNIFICANT CHANGE UP (ref 25–125)
ANION GAP SERPL CALC-SCNC: 10 MMOL/L — SIGNIFICANT CHANGE UP (ref 7–14)
ANION GAP SERPL CALC-SCNC: 10 MMOL/L — SIGNIFICANT CHANGE UP (ref 7–14)
APTT BLD: 26.6 SEC — SIGNIFICANT CHANGE UP (ref 24.5–35.6)
APTT BLD: 26.6 SEC — SIGNIFICANT CHANGE UP (ref 24.5–35.6)
AST SERPL-CCNC: 14 U/L — SIGNIFICANT CHANGE UP (ref 4–32)
AST SERPL-CCNC: 14 U/L — SIGNIFICANT CHANGE UP (ref 4–32)
BASOPHILS # BLD AUTO: 0.03 K/UL — SIGNIFICANT CHANGE UP (ref 0–0.2)
BASOPHILS # BLD AUTO: 0.03 K/UL — SIGNIFICANT CHANGE UP (ref 0–0.2)
BASOPHILS NFR BLD AUTO: 0.4 % — SIGNIFICANT CHANGE UP (ref 0–2)
BASOPHILS NFR BLD AUTO: 0.4 % — SIGNIFICANT CHANGE UP (ref 0–2)
BILIRUB SERPL-MCNC: 0.2 MG/DL — SIGNIFICANT CHANGE UP (ref 0.2–1.2)
BILIRUB SERPL-MCNC: 0.2 MG/DL — SIGNIFICANT CHANGE UP (ref 0.2–1.2)
BUN SERPL-MCNC: 15 MG/DL — SIGNIFICANT CHANGE UP (ref 7–23)
BUN SERPL-MCNC: 15 MG/DL — SIGNIFICANT CHANGE UP (ref 7–23)
CALCIUM SERPL-MCNC: 9.1 MG/DL — SIGNIFICANT CHANGE UP (ref 8.4–10.5)
CALCIUM SERPL-MCNC: 9.1 MG/DL — SIGNIFICANT CHANGE UP (ref 8.4–10.5)
CHLORIDE SERPL-SCNC: 103 MMOL/L — SIGNIFICANT CHANGE UP (ref 98–107)
CHLORIDE SERPL-SCNC: 103 MMOL/L — SIGNIFICANT CHANGE UP (ref 98–107)
CO2 SERPL-SCNC: 25 MMOL/L — SIGNIFICANT CHANGE UP (ref 22–31)
CO2 SERPL-SCNC: 25 MMOL/L — SIGNIFICANT CHANGE UP (ref 22–31)
CREAT SERPL-MCNC: 0.71 MG/DL — SIGNIFICANT CHANGE UP (ref 0.5–1.3)
CREAT SERPL-MCNC: 0.71 MG/DL — SIGNIFICANT CHANGE UP (ref 0.5–1.3)
EGFR: 119 ML/MIN/1.73M2 — SIGNIFICANT CHANGE UP
EGFR: 119 ML/MIN/1.73M2 — SIGNIFICANT CHANGE UP
EOSINOPHIL # BLD AUTO: 0.25 K/UL — SIGNIFICANT CHANGE UP (ref 0–0.5)
EOSINOPHIL # BLD AUTO: 0.25 K/UL — SIGNIFICANT CHANGE UP (ref 0–0.5)
EOSINOPHIL NFR BLD AUTO: 3.5 % — SIGNIFICANT CHANGE UP (ref 0–6)
EOSINOPHIL NFR BLD AUTO: 3.5 % — SIGNIFICANT CHANGE UP (ref 0–6)
FIBRINOGEN PPP-MCNC: 273 MG/DL — SIGNIFICANT CHANGE UP (ref 200–465)
FIBRINOGEN PPP-MCNC: 273 MG/DL — SIGNIFICANT CHANGE UP (ref 200–465)
GLUCOSE SERPL-MCNC: 89 MG/DL — SIGNIFICANT CHANGE UP (ref 70–99)
GLUCOSE SERPL-MCNC: 89 MG/DL — SIGNIFICANT CHANGE UP (ref 70–99)
HCT VFR BLD CALC: 35.7 % — SIGNIFICANT CHANGE UP (ref 34.5–45)
HCT VFR BLD CALC: 35.7 % — SIGNIFICANT CHANGE UP (ref 34.5–45)
HGB BLD-MCNC: 12.7 G/DL — SIGNIFICANT CHANGE UP (ref 11.5–15.5)
HGB BLD-MCNC: 12.7 G/DL — SIGNIFICANT CHANGE UP (ref 11.5–15.5)
IANC: 4.98 K/UL — SIGNIFICANT CHANGE UP (ref 1.8–7.4)
IANC: 4.98 K/UL — SIGNIFICANT CHANGE UP (ref 1.8–7.4)
IMM GRANULOCYTES NFR BLD AUTO: 0.6 % — SIGNIFICANT CHANGE UP (ref 0–0.9)
IMM GRANULOCYTES NFR BLD AUTO: 0.6 % — SIGNIFICANT CHANGE UP (ref 0–0.9)
INR BLD: 1 RATIO — SIGNIFICANT CHANGE UP (ref 0.85–1.18)
INR BLD: 1 RATIO — SIGNIFICANT CHANGE UP (ref 0.85–1.18)
LDH SERPL L TO P-CCNC: 203 U/L — SIGNIFICANT CHANGE UP (ref 135–225)
LDH SERPL L TO P-CCNC: 203 U/L — SIGNIFICANT CHANGE UP (ref 135–225)
LIDOCAIN IGE QN: 28 U/L — SIGNIFICANT CHANGE UP (ref 7–60)
LIDOCAIN IGE QN: 28 U/L — SIGNIFICANT CHANGE UP (ref 7–60)
LYMPHOCYTES # BLD AUTO: 1.43 K/UL — SIGNIFICANT CHANGE UP (ref 1–3.3)
LYMPHOCYTES # BLD AUTO: 1.43 K/UL — SIGNIFICANT CHANGE UP (ref 1–3.3)
LYMPHOCYTES # BLD AUTO: 19.9 % — SIGNIFICANT CHANGE UP (ref 13–44)
LYMPHOCYTES # BLD AUTO: 19.9 % — SIGNIFICANT CHANGE UP (ref 13–44)
MCHC RBC-ENTMCNC: 33.2 PG — SIGNIFICANT CHANGE UP (ref 27–34)
MCHC RBC-ENTMCNC: 33.2 PG — SIGNIFICANT CHANGE UP (ref 27–34)
MCHC RBC-ENTMCNC: 35.6 GM/DL — SIGNIFICANT CHANGE UP (ref 32–36)
MCHC RBC-ENTMCNC: 35.6 GM/DL — SIGNIFICANT CHANGE UP (ref 32–36)
MCV RBC AUTO: 93.2 FL — SIGNIFICANT CHANGE UP (ref 80–100)
MCV RBC AUTO: 93.2 FL — SIGNIFICANT CHANGE UP (ref 80–100)
MONOCYTES # BLD AUTO: 0.45 K/UL — SIGNIFICANT CHANGE UP (ref 0–0.9)
MONOCYTES # BLD AUTO: 0.45 K/UL — SIGNIFICANT CHANGE UP (ref 0–0.9)
MONOCYTES NFR BLD AUTO: 6.3 % — SIGNIFICANT CHANGE UP (ref 2–14)
MONOCYTES NFR BLD AUTO: 6.3 % — SIGNIFICANT CHANGE UP (ref 2–14)
NEUTROPHILS # BLD AUTO: 4.98 K/UL — SIGNIFICANT CHANGE UP (ref 1.8–7.4)
NEUTROPHILS # BLD AUTO: 4.98 K/UL — SIGNIFICANT CHANGE UP (ref 1.8–7.4)
NEUTROPHILS NFR BLD AUTO: 69.3 % — SIGNIFICANT CHANGE UP (ref 43–77)
NEUTROPHILS NFR BLD AUTO: 69.3 % — SIGNIFICANT CHANGE UP (ref 43–77)
NRBC # BLD: 0 /100 WBCS — SIGNIFICANT CHANGE UP (ref 0–0)
NRBC # BLD: 0 /100 WBCS — SIGNIFICANT CHANGE UP (ref 0–0)
NRBC # FLD: 0 K/UL — SIGNIFICANT CHANGE UP (ref 0–0)
NRBC # FLD: 0 K/UL — SIGNIFICANT CHANGE UP (ref 0–0)
PLATELET # BLD AUTO: 259 K/UL — SIGNIFICANT CHANGE UP (ref 150–400)
PLATELET # BLD AUTO: 259 K/UL — SIGNIFICANT CHANGE UP (ref 150–400)
POTASSIUM SERPL-MCNC: 4.2 MMOL/L — SIGNIFICANT CHANGE UP (ref 3.5–5.3)
POTASSIUM SERPL-MCNC: 4.2 MMOL/L — SIGNIFICANT CHANGE UP (ref 3.5–5.3)
POTASSIUM SERPL-SCNC: 4.2 MMOL/L — SIGNIFICANT CHANGE UP (ref 3.5–5.3)
POTASSIUM SERPL-SCNC: 4.2 MMOL/L — SIGNIFICANT CHANGE UP (ref 3.5–5.3)
PROT SERPL-MCNC: 6.5 G/DL — SIGNIFICANT CHANGE UP (ref 6–8.3)
PROT SERPL-MCNC: 6.5 G/DL — SIGNIFICANT CHANGE UP (ref 6–8.3)
PROTHROM AB SERPL-ACNC: 11.2 SEC — SIGNIFICANT CHANGE UP (ref 9.5–13)
PROTHROM AB SERPL-ACNC: 11.2 SEC — SIGNIFICANT CHANGE UP (ref 9.5–13)
RBC # BLD: 3.83 M/UL — SIGNIFICANT CHANGE UP (ref 3.8–5.2)
RBC # BLD: 3.83 M/UL — SIGNIFICANT CHANGE UP (ref 3.8–5.2)
RBC # FLD: 11.7 % — SIGNIFICANT CHANGE UP (ref 10.3–14.5)
RBC # FLD: 11.7 % — SIGNIFICANT CHANGE UP (ref 10.3–14.5)
SODIUM SERPL-SCNC: 138 MMOL/L — SIGNIFICANT CHANGE UP (ref 135–145)
SODIUM SERPL-SCNC: 138 MMOL/L — SIGNIFICANT CHANGE UP (ref 135–145)
URATE SERPL-MCNC: 4 MG/DL — SIGNIFICANT CHANGE UP (ref 2.5–7)
URATE SERPL-MCNC: 4 MG/DL — SIGNIFICANT CHANGE UP (ref 2.5–7)
WBC # BLD: 7.18 K/UL — SIGNIFICANT CHANGE UP (ref 3.8–10.5)
WBC # BLD: 7.18 K/UL — SIGNIFICANT CHANGE UP (ref 3.8–10.5)
WBC # FLD AUTO: 7.18 K/UL — SIGNIFICANT CHANGE UP (ref 3.8–10.5)
WBC # FLD AUTO: 7.18 K/UL — SIGNIFICANT CHANGE UP (ref 3.8–10.5)

## 2023-11-12 PROCEDURE — 99212 OFFICE O/P EST SF 10 MIN: CPT

## 2023-11-12 PROCEDURE — 76700 US EXAM ABDOM COMPLETE: CPT | Mod: 26

## 2023-11-12 NOTE — OB POSTPARTUM TRIAGE NOTE - NSOBPROVIDERNOTE_OBGYN_ALL_OB_FT
Plan D/W Dr. Jimenez, no evidence of acute process or PEC at this time.   Patient dc'd home. Plan D/W Dr. Jimenez, no evidence of acute process or PEC at this time.   Patient dc'd home.  Follow up with primary MD if symptoms do not resolve.

## 2023-11-12 NOTE — OB POSTPARTUM TRIAGE NOTE - HISTORY OF PRESENT ILLNESS
27yo  s/p  on . Patient was IOL for GHTN and reports normal BP's during labor and post partum. Presents today with c/o DORSEY from yesterday with /90, relieved by Tylenol. Reports she has HA again today 3/10 pain. Reports since last night nausea and diffuse abdominal pain that radiates to her back. Denies fever, vomiting, diarrhea. Reports nausea.   Patient currently pumping.     H/O Asthma- used rescue inhaler a few weeks ago

## 2023-11-12 NOTE — OB POSTPARTUM TRIAGE NOTE - NSHPPHYSICALEXAM_GEN_ALL_CORE
Assessment reveals VSS, abdomen soft, NT.   No rebound, no guarding  No CVA tenderness  BP range 120//81 pulse 68.   +bowel sounds noted.     Declines pain medication for her HA.     HELLP labs  Amylase/Lipase sent  Abdominal US ordered    Abdominal US reveals:   Mild fullness of right intrarenal collecting system without true hydronephrosis. Otherwise normal US.     HELLP labs WNL. Assessment reveals VSS, abdomen soft, NT.   No rebound, no guarding  No CVA tenderness  BP range 120//81 pulse 68.   +bowel sounds noted.     Declines pain medication for her HA.     HELLP labs  Amylase/Lipase sent  Abdominal US ordered    Abdominal US reveals:   Mild fullness of right intrarenal collecting system without true hydronephrosis. Otherwise normal US.     HELLP labs WNL.  Amylase/Lipase 42/28

## 2023-11-14 ENCOUNTER — NON-APPOINTMENT (OUTPATIENT)
Age: 28
End: 2023-11-14

## 2023-11-14 DIAGNOSIS — R51.9 HEADACHE, UNSPECIFIED: ICD-10-CM

## 2023-11-14 DIAGNOSIS — R10.9 UNSPECIFIED ABDOMINAL PAIN: ICD-10-CM

## 2023-11-14 DIAGNOSIS — R11.0 NAUSEA: ICD-10-CM

## 2023-11-20 ENCOUNTER — NON-APPOINTMENT (OUTPATIENT)
Age: 28
End: 2023-11-20

## 2023-11-28 ENCOUNTER — NON-APPOINTMENT (OUTPATIENT)
Age: 28
End: 2023-11-28

## 2023-12-13 ENCOUNTER — APPOINTMENT (OUTPATIENT)
Age: 28
End: 2023-12-13
Payer: COMMERCIAL

## 2023-12-13 ENCOUNTER — APPOINTMENT (OUTPATIENT)
Age: 28
End: 2023-12-13

## 2023-12-13 PROCEDURE — S9443: CPT | Mod: 95

## 2024-09-19 ENCOUNTER — EMERGENCY (EMERGENCY)
Age: 29
LOS: 1 days | Discharge: HOME | End: 2024-09-19
Attending: EMERGENCY MEDICINE | Admitting: EMERGENCY MEDICINE
Payer: COMMERCIAL

## 2024-09-19 VITALS
RESPIRATION RATE: 18 BRPM | SYSTOLIC BLOOD PRESSURE: 118 MMHG | DIASTOLIC BLOOD PRESSURE: 80 MMHG | TEMPERATURE: 98 F | HEART RATE: 71 BPM | OXYGEN SATURATION: 100 %

## 2024-09-19 PROCEDURE — 99283 EMERGENCY DEPT VISIT LOW MDM: CPT

## 2024-09-19 NOTE — ED PEDIATRIC TRIAGE NOTE - CHIEF COMPLAINT QUOTE
30 yo f pmh asthma and 3 wks pregnant presenting for fall down approx 5 stairs.  C/o 5/10 headache and 6/10 buttock pain on arrival.  Refused c-collar as per EMS.  States potentially vasovagaled after fall.  NKA.

## 2024-09-19 NOTE — ED PROVIDER NOTE - CLINICAL SUMMARY MEDICAL DECISION MAKING FREE TEXT BOX
29-year-old female with past medical history of asthma presents to the emergency department 3 weeks pregnant due to fall at home.  She was holding her 80-hlunu-ezb baby when she slid down 5 steps after she slipped causing a mechanical fall. No loss of consciousness and was able to ambulate after the fall. Had an episode where she was anxious and lightheaded after the fall similar to past episodes shes had and already been worked up for. Vitals are stable here in the ED and she is neuro intact. She is able to ambulate. Patient would like to only be seen here in the peds ED and not go to the adult ED. 29-year-old female with past medical history of asthma presents to the emergency department 3 weeks pregnant due to fall at home.  She was holding her 48-avyov-gni baby when she slid down 5 steps after she slipped causing a mechanical fall. No loss of consciousness and was able to ambulate after the fall. Had an episode where she was anxious and lightheaded after the fall similar to past episodes shes had and already been worked up for. Vitals are stable here in the ED and she is neuro intact. She is able to ambulate. Patient would like to only be seen here in the peds ED and not go to the adult ED.    Niyah Wheeler MD - Attending Physician: Pt here with fall. Slip down 5 stairs along buttock/back. No LOC. No dizziness. Able to ambulate immediately after. Did have presyncopal feeling after - but has had many times in past. Now with mild R buttock pain but ambulatory. No head/neck tenderness or injury noted. Neuro intact. Supportive care for contusion. F/u with PMD. Return precautions discussed

## 2024-09-19 NOTE — ED PROVIDER NOTE - OBJECTIVE STATEMENT
29-year-old female with past medical history of asthma presents to the emergency department 3 weeks pregnant due to fall at home.  She was holding her 60-vyqge-pxr baby when she slid down 5 steps after she slipped.  Denies any cause of the fall aside from just her slipping.  Slid down the steps on her backside.  Does not believe she hit her head.  Currently she has a headache and pain on the right side of her buttocks and leg.  She was able to walk after the fall.  She did have been episode where she felt she may pass out.  Patient did not have syncopal episode. She has had these episodes in the past and has been worked up by cardiology for this.  Patient currently denies any nausea, vomiting. Patient called EMS because she was home alone and wanted to make sure everything was okay since she felt like she could pass out.

## 2024-09-19 NOTE — ED PROVIDER NOTE - NSFOLLOWUPINSTRUCTIONS_ED_ALL_ED_FT
No signs of emergency medical condition on today's workup.  Presumptive diagnosis made, but further evaluation may be required by your primary care doctor or specialist for a definitive diagnosis.  Therefore, follow up as directed and if symptoms change/worsen or any emergency conditions, please return to the ER.    -- Please follow-up with your doctor(s) within the next 3 days, but see medical sooner if your symptoms persist or worsen.  Please call tomorrow for an appointment.  If you cannot follow-up with your primary doctor please return to the ED for any urgent issues.  -- If you have any worsening of symptoms or any other concerns please see your doctor or return to the ED immediately. If your headache gets worse, have nausea/vomiting, pass out, vision changes, are unable to eat/drink please return to the emergency department immediately.   -- Please continue taking your home medications as directed.  Do not use alcohol when taking any medication (especially antibiotics, tylenol or other pain medication) unless you check with the doctor or pharmacist.

## 2024-09-19 NOTE — ED PROVIDER NOTE - CARE PLAN
1 Principal Discharge DX:	Fall at home   Principal Discharge DX:	Contusion  Secondary Diagnosis:	Fall at home

## 2024-09-19 NOTE — ED PROVIDER NOTE - PROGRESS NOTE DETAILS
Discussed discharge with patient who is amenable. She was told she can take tylenol for pain if she needs something to help relieve symptoms. Patient understood return precautions and symptoms to look out for. Declined meds/imaging at this time. Discussed discharge with patient who is amenable. She was told she can take tylenol for pain if she needs something to help relieve symptoms. Patient understood return precautions and symptoms to look out for.

## 2024-09-19 NOTE — ED PROVIDER NOTE - PATIENT PORTAL LINK FT
You can access the FollowMyHealth Patient Portal offered by [facility] by registering at the following website: [email or web address]. By joining [employer]’s FollowMyHealth portal, you will also be able to view your health information using other applications (apps) compatible with our system.

## 2024-09-19 NOTE — ED PROVIDER NOTE - PHYSICAL EXAMINATION
Gen: No acute distress  HEENT: pupils reactive, EOMI, no nasal discharge, mucous membranes moist  CV: RRR, +S1/S2, 2+ radial pulses b/l  Resp: CTAB, no W/R/R, no accessory muscle use, no increased work of breathing  GI: Abdomen soft non-distended, No tenderness to palpation.   MSK: Bruising in the right buttocks upper leg area, no open wounds. No LE edema  Neuro: A&Ox4, following commands, moving all four extremities spontaneously. Strength 5/5 in all extremities bilaterally. Patient able to ambulate.   Psych: appropriate mood

## 2024-09-29 NOTE — ED ADULT NURSE NOTE - SUICIDE SCREENING QUESTION 2
[facility]    Medicine Progress Note - Hospitalist Service, GOLD TEAM 17    Date of Admission:  9/28/2024    Assessment & Plan   30 year old female who has PMH notable for recently diagnosed liver cirrhosis, h/o alcohol use disorder, h/o chronic pancreatitis admitted on 9/28/2024 for severe hyperglycemia WITHOUT e/o DKA. Etiology of hyperglycemia uncertain, admitted for insulin gtt, further workup, evaluation by Endocrinology.     Updates   -No acute events overnight  -Patient continues on insulin gtt. Endocrinology following the patient.   -PO Dilaudid started on right foot pain.      #Severe hyperglycemia, w/o DKA  #Diabetes mellitus, subtype uncertain (T2DM, pancreatogenic)  #H/o chronic pancreatitis  #Pseudohyponatremia 2/2 hyperglycemia (114 corrects to WNL)  Endocrinology following the patient and recommended:  Plan/Recommendations:    - Lantus 15 units q 24 hrs at 0900   - Novolog Meal Coverage: 1 units per 12 g CHO, TID AC and PRN with snacks/supplements   - Novolog Correction Scale: medium resistance (ISF: 50)  TID AC / HS / 0200   - BG monitoring: TID AC, HS, 0200 / Every 1-2 hours on insulin drip, will transition to AC and HS once off IV insulin    - Hypoglycemia protocol    - Carb counting protocol      Labs Pending to be resulted  IVAN Ab  Islet Cell Antibody  C Peptide     #Cirrhosis  Diagnosed in June 2024 after presenting to OSH w/ decompensated cirrhosis. Attributed to alcohol use, though given patient's young age, wonder if there is additional contributor leading to liver dysfunction, especially given pancreatic dysfunction as above.   No hx of liver disease in family, but there is an autoimmune hx of lupus, DM. No known family members with hemochromatosis w/ iron overload. Negative hepatitis panel. She did have some rheumatologic/autoimmune labs checked in June/July 2024 (cold agglutinin titer, SCL 70 IgG isael, MIKEY, autoimmune liver disease panel, RF,  CCP isael) all unremarkable. She had an elevated alpha 1 antitrysin of 217 (ULN= 200) and low ceruloplasmin of 19.2 (LLN 20). 24H copper urine was ordered given low ceruloplasmin by [facility] GI provider in July 2024, does not appear this has been done. Pt has also not been tested for hemochromatosis. Ferritin checked 7/31 elevated to 254, but WNL at 41 on 9/16 (pt had been started on ferrous gluconate). Other iron studies from 9/16 (compared to 7/31) w/ iron 25/ low (60/ wnl), TIBC 302/ wnl (141/ low), TSAT 8/ low (43/ wnl). High folate and B12. This admission, INR 1.19 (stable compared to 9/16) but normal AST/ALT, alk phos, and bili. HILDA not detectable. Pt reporting last drink in June 2024, denies other substance use.   MELD 3.0: 19 at 9/28/2024  3:26 PM  MELD-Na: 8 at 9/28/2024  3:26 PM  Calculated from:  Serum Creatinine: 0.70 mg/dL (Using min of 1 mg/dL) at 9/28/2024  3:26 PM  Serum Sodium: 123 mmol/L (Using min of 125 mmol/L) at 9/28/2024  3:26 PM  Total Bilirubin: 0.6 mg/dL (Using min of 1 mg/dL) at 9/28/2024 12:30 PM  Serum Albumin: 4.4 g/dL (Using max of 3.5 g/dL) at 9/28/2024 12:30 PM  INR(ratio): 1.19 at 9/28/2024 12:30 PM  Age at listing (hypothetical): 30 years  Sex: Female at 9/28/2024  3:26 PM     - PeTH, UDS  - Hold PTA lasix and spironolactone in setting of hyperglycemia and IVF resuscitation   - Daily MELD labs  - Social work consult to ensure pt is set up w/ resources to ensure support for sobriety, mental health, etc as we want to set her up for success in the future if/when she gets a transplant     #R foot, ankle, knee pain   Etiology unclear. Has been bothersome since June 2024, does report someone stepping on her foot but not sure if this is related. Has had multiple imaging studies (XR, CT, MRI) w/o remarkable abnormalities to explain pain. PCP questioned if pt has CRPS-- ordered triple phase bone scan to eval, does not appear to have been completed. Uric acid checked 7/31 was elevated to 11,  was WNL in June 2024. Exam not c/w gout (no significant erythema, minimal swelling, able to tolerate palpation), though this is a possibility to consider if colchicine or allopurinol have not been trialed. She does have a hx of lumbar fusion and noted possible foot drop in Juley 2024-- MRI lumbar spine w/  mild to mod R neural foraminal stenossi @ L4-5. On exam, no objective weakness noted.   Requesting something stronger than ketorolac for pain. Given unremarkable objective workup, do not have strong indication to provide opioids for pain, especially given it is not acutely changed. CRPS is certainly a reasonable differential, though w/o further information/data, difficult to justify opioids for treatment. I do find her focus on her foot pain in light of her severe liver disease and very high BG/potential for pancreatic dysfunction to be somewhat strange and unexpected. No overt signs to suggest drug seeking behavior, but her behavior is somewhat odd nonetheless; want to be judicious in evaluation of pain and use of opioids given she does have a hx of AUD, suggesting potential tendency for addictive behavior. If she is being worked up for a transplant, would like to prevent any complications that would prevent candidacy/success.   - Continue PTA duloxetine  - Continue PTA gabapentin 300mg TID  - Continue PTA tizanidine PRN  - Consider colchicine vs allopurinol if c/f gout  - Consider high dose Vitamin C if high suspicion for CRPS  - Complaining of severe pain. Short course of PO Dilaudid for now.   :: Obtain triple phase bone scan as recommended by PCP in OP setting     #Prolonged QTc (511 on 9/28): Caution w/ QTc prolonging medications  #H/o spinal fusion: Reporting some R sided foot drop, no objective weakness on exam (though did not walk patient). MRI lumbar spine within last few months did show mild to mod spinal stenosis.          Diet: Regular Diet Adult    DVT Prophylaxis: Pneumatic Compression Devices and  Anti-embolisim stockings (TEDs)  [place] Catheter: Not present  Lines: None     Cardiac Monitoring: None  Code Status: Full Code      Clinically Significant Risk Factors        # Hypokalemia: Lowest K = 3 mmol/L in last 2 days, will replace as needed  # Hyponatremia: Lowest Na = 114 mmol/L in last 2 days, will monitor as appropriate                   # DMII: A1C = 9.5 % (Ref range: <5.7 %) within past 6 months, PRESENT ON ADMISSION      # Financial/Environmental Concerns:           Disposition Plan     Medically Ready for Discharge: Anticipated in 2-4 Days             Tae Berry MD  Hospitalist Service, GOLD TEAM 17  [facility]  Securely message with [employer] (more info)  Text page via [facility] Paging/Directory   See signed in provider for up to date coverage information  ______________________________________________________________________    Interval History     No acute events overnight.   R foot as above.     Physical Exam   Vital Signs: Temp: 98.1  F (36.7  C) Temp src: Oral BP: 99/68 Pulse: 100   Resp: 18 SpO2: 99 % O2 Device: None (Room air)    Weight: 98 lbs 8 oz    General Appearance:  Alert, room air, moderate distress. Multiple tattoos.   Respiratory: Lungs CTAB, breathing comfortably on room air.   Cardiovascular: RRR, no murmur appreciated.   GI: Abdomen is non distended, soft, non tender.   Skin: Warm and dry.   Other:  Alert and oriented. Moving all extremities.      Medical Decision Making       45 MINUTES SPENT BY ME on the date of service doing chart review, history, exam, documentation & further activities per the note.      Data     I have personally reviewed the following data over the past 24 hrs:    9.0  \   9.9 (L)   / 180     133 (L) 102 18.1 /  156 (H)   3.6 21 (L) 0.85 \     ALT: 8 AST: 32 AP: 110 TBILI: 0.6   ALB: 3.6 TOT PROTEIN: 6.8 LIPASE: N/A     TSH: N/A T4: N/A A1C: N/A     Procal: N/A CRP: N/A Lactic Acid: 2.5 (H)       INR:  1.15  PTT:  N/A   D-dimer:  N/A Fibrinogen:  N/A       Imaging results reviewed over the past 24 hrs:   No results found for this or any previous visit (from the past 24 hour(s)).   No

## 2024-11-18 ENCOUNTER — ASOB RESULT (OUTPATIENT)
Age: 29
End: 2024-11-18

## 2024-11-18 ENCOUNTER — APPOINTMENT (OUTPATIENT)
Dept: ANTEPARTUM | Facility: CLINIC | Age: 29
End: 2024-11-18
Payer: COMMERCIAL

## 2024-11-18 PROCEDURE — 76801 OB US < 14 WKS SINGLE FETUS: CPT

## 2024-11-18 PROCEDURE — 76813 OB US NUCHAL MEAS 1 GEST: CPT

## 2024-12-19 ENCOUNTER — OUTPATIENT (OUTPATIENT)
Dept: INPATIENT UNIT | Facility: HOSPITAL | Age: 29
LOS: 1 days | Discharge: HOME | End: 2024-12-19
Payer: COMMERCIAL

## 2024-12-19 ENCOUNTER — EMERGENCY (EMERGENCY)
Facility: HOSPITAL | Age: 29
LOS: 1 days | Discharge: STILL ADMITTED OR ADMINISTRATIVE | End: 2024-12-19
Attending: EMERGENCY MEDICINE | Admitting: EMERGENCY MEDICINE
Payer: COMMERCIAL

## 2024-12-19 ENCOUNTER — APPOINTMENT (OUTPATIENT)
Dept: ANTEPARTUM | Facility: CLINIC | Age: 29
End: 2024-12-19

## 2024-12-19 VITALS — HEART RATE: 87 BPM | SYSTOLIC BLOOD PRESSURE: 112 MMHG | DIASTOLIC BLOOD PRESSURE: 58 MMHG

## 2024-12-19 VITALS
WEIGHT: 149.91 LBS | SYSTOLIC BLOOD PRESSURE: 107 MMHG | OXYGEN SATURATION: 100 % | DIASTOLIC BLOOD PRESSURE: 64 MMHG | HEART RATE: 105 BPM | RESPIRATION RATE: 16 BRPM | TEMPERATURE: 98 F | HEIGHT: 66 IN

## 2024-12-19 VITALS
RESPIRATION RATE: 16 BRPM | DIASTOLIC BLOOD PRESSURE: 58 MMHG | TEMPERATURE: 99 F | SYSTOLIC BLOOD PRESSURE: 112 MMHG | HEART RATE: 81 BPM

## 2024-12-19 DIAGNOSIS — O26.899 OTHER SPECIFIED PREGNANCY RELATED CONDITIONS, UNSPECIFIED TRIMESTER: ICD-10-CM

## 2024-12-19 LAB
ALBUMIN SERPL ELPH-MCNC: 3.9 G/DL — SIGNIFICANT CHANGE UP (ref 3.3–5)
ALP SERPL-CCNC: 50 U/L — SIGNIFICANT CHANGE UP (ref 40–120)
ALT FLD-CCNC: 5 U/L — SIGNIFICANT CHANGE UP (ref 4–33)
AMYLASE P1 CFR SERPL: 46 U/L — SIGNIFICANT CHANGE UP (ref 25–125)
ANION GAP SERPL CALC-SCNC: 13 MMOL/L — SIGNIFICANT CHANGE UP (ref 7–14)
APPEARANCE UR: CLEAR — SIGNIFICANT CHANGE UP
AST SERPL-CCNC: 14 U/L — SIGNIFICANT CHANGE UP (ref 4–32)
BACTERIA # UR AUTO: ABNORMAL /HPF
BILIRUB SERPL-MCNC: 0.6 MG/DL — SIGNIFICANT CHANGE UP (ref 0.2–1.2)
BILIRUB UR-MCNC: NEGATIVE — SIGNIFICANT CHANGE UP
BUN SERPL-MCNC: 15 MG/DL — SIGNIFICANT CHANGE UP (ref 7–23)
CALCIUM SERPL-MCNC: 8.8 MG/DL — SIGNIFICANT CHANGE UP (ref 8.4–10.5)
CAST: 1 /LPF — SIGNIFICANT CHANGE UP (ref 0–4)
CHLORIDE SERPL-SCNC: 101 MMOL/L — SIGNIFICANT CHANGE UP (ref 98–107)
CO2 SERPL-SCNC: 21 MMOL/L — LOW (ref 22–31)
COLOR SPEC: YELLOW — SIGNIFICANT CHANGE UP
CREAT SERPL-MCNC: 0.6 MG/DL — SIGNIFICANT CHANGE UP (ref 0.5–1.3)
DIFF PNL FLD: NEGATIVE — SIGNIFICANT CHANGE UP
EGFR: 125 ML/MIN/1.73M2 — SIGNIFICANT CHANGE UP
FLUAV AG NPH QL: SIGNIFICANT CHANGE UP
FLUBV AG NPH QL: SIGNIFICANT CHANGE UP
GLUCOSE SERPL-MCNC: 103 MG/DL — HIGH (ref 70–99)
GLUCOSE UR QL: NEGATIVE MG/DL — SIGNIFICANT CHANGE UP
HCT VFR BLD CALC: 31.5 % — LOW (ref 34.5–45)
HGB BLD-MCNC: 11.4 G/DL — LOW (ref 11.5–15.5)
KETONES UR-MCNC: 80 MG/DL
LEUKOCYTE ESTERASE UR-ACNC: ABNORMAL
LIDOCAIN IGE QN: 22 U/L — SIGNIFICANT CHANGE UP (ref 7–60)
MCHC RBC-ENTMCNC: 32.2 PG — SIGNIFICANT CHANGE UP (ref 27–34)
MCHC RBC-ENTMCNC: 36.2 G/DL — HIGH (ref 32–36)
MCV RBC AUTO: 89 FL — SIGNIFICANT CHANGE UP (ref 80–100)
NITRITE UR-MCNC: NEGATIVE — SIGNIFICANT CHANGE UP
NRBC # BLD: 0 /100 WBCS — SIGNIFICANT CHANGE UP (ref 0–0)
NRBC # FLD: 0 K/UL — SIGNIFICANT CHANGE UP (ref 0–0)
PH UR: 6.5 — SIGNIFICANT CHANGE UP (ref 5–8)
PLATELET # BLD AUTO: 149 K/UL — LOW (ref 150–400)
POTASSIUM SERPL-MCNC: 3.6 MMOL/L — SIGNIFICANT CHANGE UP (ref 3.5–5.3)
POTASSIUM SERPL-SCNC: 3.6 MMOL/L — SIGNIFICANT CHANGE UP (ref 3.5–5.3)
PROT SERPL-MCNC: 6.5 G/DL — SIGNIFICANT CHANGE UP (ref 6–8.3)
PROT UR-MCNC: 30 MG/DL
RBC # BLD: 3.54 M/UL — LOW (ref 3.8–5.2)
RBC # FLD: 12 % — SIGNIFICANT CHANGE UP (ref 10.3–14.5)
RBC CASTS # UR COMP ASSIST: 1 /HPF — SIGNIFICANT CHANGE UP (ref 0–4)
REVIEW: SIGNIFICANT CHANGE UP
RSV RNA NPH QL NAA+NON-PROBE: SIGNIFICANT CHANGE UP
SARS-COV-2 RNA SPEC QL NAA+PROBE: SIGNIFICANT CHANGE UP
SODIUM SERPL-SCNC: 135 MMOL/L — SIGNIFICANT CHANGE UP (ref 135–145)
SP GR SPEC: 1.03 — HIGH (ref 1–1.03)
SQUAMOUS # UR AUTO: 4 /HPF — SIGNIFICANT CHANGE UP (ref 0–5)
UROBILINOGEN FLD QL: 1 MG/DL — SIGNIFICANT CHANGE UP (ref 0.2–1)
WBC # BLD: 5.16 K/UL — SIGNIFICANT CHANGE UP (ref 3.8–10.5)
WBC # FLD AUTO: 5.16 K/UL — SIGNIFICANT CHANGE UP (ref 3.8–10.5)
WBC UR QL: 1 /HPF — SIGNIFICANT CHANGE UP (ref 0–5)

## 2024-12-19 PROCEDURE — 99221 1ST HOSP IP/OBS SF/LOW 40: CPT

## 2024-12-19 PROCEDURE — L9996: CPT

## 2024-12-19 RX ORDER — 0.9 % SODIUM CHLORIDE 0.9 %
1000 INTRAVENOUS SOLUTION INTRAVENOUS ONCE
Refills: 0 | Status: COMPLETED | OUTPATIENT
Start: 2024-12-19 | End: 2024-12-19

## 2024-12-19 RX ORDER — 0.9 % SODIUM CHLORIDE 0.9 %
1000 INTRAVENOUS SOLUTION INTRAVENOUS ONCE
Refills: 0 | Status: ACTIVE | OUTPATIENT
Start: 2024-12-19 | End: 2024-12-19

## 2024-12-19 RX ORDER — ONDANSETRON HYDROCHLORIDE 4 MG/1
8 TABLET, FILM COATED ORAL ONCE
Refills: 0 | Status: COMPLETED | OUTPATIENT
Start: 2024-12-19 | End: 2024-12-19

## 2024-12-19 RX ADMIN — Medication 2000 MILLILITER(S): at 13:09

## 2024-12-19 RX ADMIN — ONDANSETRON HYDROCHLORIDE 8 MILLIGRAM(S): 4 TABLET, FILM COATED ORAL at 13:09

## 2024-12-19 NOTE — OB PROVIDER TRIAGE NOTE - NSHPLABSRESULTS_GEN_ALL_CORE
CBC Full  -  ( 19 Dec 2024 13:13 )  WBC Count : 5.16 K/uL  RBC Count : 3.54 M/uL  Hemoglobin : 11.4 g/dL  Hematocrit : 31.5 %  Platelet Count - Automated : 149 K/uL  Mean Cell Volume : 89.0 fL  Mean Cell Hemoglobin : 32.2 pg  Mean Cell Hemoglobin Concentration : 36.2 g/dL  Auto Neutrophil # : x  Auto Lymphocyte # : x  Auto Monocyte # : x  Auto Eosinophil # : x  Auto Basophil # : x  Auto Neutrophil % : x  Auto Lymphocyte % : x  Auto Monocyte % : x  Auto Eosinophil % : x  Auto Basophil % : x        135  |  101  |  15  ----------------------------<  103[H]  3.6   |  21[L]  |  0.60    Ca    8.8      19 Dec 2024 13:13    TPro  6.5  /  Alb  3.9  /  TBili  0.6  /  DBili  x   /  AST  14  /  ALT  5   /  AlkPhos  50      amylase: 46  lipase: 22  Urinalysis Basic - ( 19 Dec 2024 13:21 )    Color: Yellow / Appearance: Clear / S.031 / pH: x  Gluc: x / Ketone: 80 mg/dL  / Bili: Negative / Urobili: 1.0 mg/dL   Blood: x / Protein: 30 mg/dL / Nitrite: Negative   Leuk Esterase: Trace / RBC: 1 /HPF / WBC 1 /HPF   Sq Epi: x / Non Sq Epi: 4 /HPF / Bacteria: Occasional /HPF      RVP:  none detected

## 2024-12-19 NOTE — OB PROVIDER TRIAGE NOTE - NSOBPROVIDERNOTE_OBGYN_ALL_OB_FT
28 y/o  @ 16.5 with nausea/ vomiting :  IV LR 1000 milliliter bolus 1309  IV LR 1000 milliliter bolus 1417  Zofran 8 mg IVP 1309     will continue to monitor 30 y/o  @ 16.5 with nausea/ vomiting :  IV LR 1000 milliliter bolus 1309  IV LR 1000 milliliter bolus 1417  Zofran 8 mg IVP 1309     will continue to monitor    1515:   pt states " feeling a little better "   pt given pretzels   will continue to monitor 30 y/o  @ 16.5 with nausea/ vomiting :  IV LR 1000 milliliter bolus 1309  IV LR 1000 milliliter bolus 1417  Zofran 8 mg IVP 1309     will continue to monitor    1515:   pt states " feeling a little better "   pt given pretzels   will continue to monitor  1600 : pt feeling better   tolerated pretzels     plan of care d/w dr Linder   pt likely with gastroenteritis   maternal and fetal status reassuring   pt to be discharged home   discharge   increase fluid intake  BRAT/ BLAND diet instructions d/w pt   follow up with dr amee bill as sched   w/v discharge instructions given  discharged home     discharged @ 1612

## 2024-12-19 NOTE — OB PROVIDER TRIAGE NOTE - HISTORY OF PRESENT ILLNESS
PNC with Dr Jorge Luis Solano   30 y/o  @ 16.5 wks gestation presents with c/o nausea/ vomiting since 1200 states has been vomiting every hour and unable to tolerate po fluids and solids , pt states has no recent sick exposure and was on Di Clegis and d dwaine 2 days ago denies any diarrhea denies any fever or chills denies any uc's vb or lof ap care has been complicated by :  hyperemesis - Di Clegis dced 2 days ago

## 2024-12-19 NOTE — OB PROVIDER TRIAGE NOTE - ADDITIONAL INSTRUCTIONS
discharge   increase fluid intake  BRAT/ BLAND diet instructions d/w pt   follow up with dr amee bill as sched   w/v discharge instructions given  discharged home     discharged @ 5385

## 2024-12-19 NOTE — OB PROVIDER TRIAGE NOTE - NSHPPHYSICALEXAM_GEN_ALL_CORE
abdomen: soft, nt on palp  T(C): 37 (12-19-24 @ 12:33), Max: 37 (12-19-24 @ 12:33)  HR: 87 (12-19-24 @ 12:48) (81 - 105)  BP: 112/58 (12-19-24 @ 12:48) (107/64 - 112/58)  RR: 16 (12-19-24 @ 12:33) (16 - 16)  SpO2: 100% (12-19-24 @ 11:51) (100% - 100%)  toco: no uterine contractions noted   OB limited sono   sono reports in ASOB  sono images in ASOB  TAS posterior placenta AAFI FH: 162 bpm

## 2024-12-20 DIAGNOSIS — J45.909 UNSPECIFIED ASTHMA, UNCOMPLICATED: ICD-10-CM

## 2024-12-20 DIAGNOSIS — O99.512 DISEASES OF THE RESPIRATORY SYSTEM COMPLICATING PREGNANCY, SECOND TRIMESTER: ICD-10-CM

## 2024-12-20 DIAGNOSIS — K52.9 NONINFECTIVE GASTROENTERITIS AND COLITIS, UNSPECIFIED: ICD-10-CM

## 2024-12-20 DIAGNOSIS — Z20.822 CONTACT WITH AND (SUSPECTED) EXPOSURE TO COVID-19: ICD-10-CM

## 2024-12-20 DIAGNOSIS — Z3A.16 16 WEEKS GESTATION OF PREGNANCY: ICD-10-CM

## 2024-12-20 DIAGNOSIS — O99.612 DISEASES OF THE DIGESTIVE SYSTEM COMPLICATING PREGNANCY, SECOND TRIMESTER: ICD-10-CM

## 2024-12-20 PROBLEM — I45.6 PRE-EXCITATION SYNDROME: Chronic | Status: INACTIVE | Noted: 2023-10-10 | Resolved: 2024-12-19

## 2025-01-13 ENCOUNTER — APPOINTMENT (OUTPATIENT)
Dept: ANTEPARTUM | Facility: CLINIC | Age: 30
End: 2025-01-13
Payer: COMMERCIAL

## 2025-01-13 ENCOUNTER — ASOB RESULT (OUTPATIENT)
Age: 30
End: 2025-01-13

## 2025-01-13 PROCEDURE — 76805 OB US >/= 14 WKS SNGL FETUS: CPT

## 2025-05-13 ENCOUNTER — INPATIENT (INPATIENT)
Facility: HOSPITAL | Age: 30
LOS: 2 days | Discharge: HOME | End: 2025-05-16
Attending: OBSTETRICS & GYNECOLOGY | Admitting: OBSTETRICS & GYNECOLOGY

## 2025-05-13 ENCOUNTER — APPOINTMENT (OUTPATIENT)
Dept: ANTEPARTUM | Facility: CLINIC | Age: 30
End: 2025-05-13

## 2025-05-13 VITALS
SYSTOLIC BLOOD PRESSURE: 126 MMHG | DIASTOLIC BLOOD PRESSURE: 73 MMHG | TEMPERATURE: 99 F | RESPIRATION RATE: 15 BRPM | HEART RATE: 80 BPM

## 2025-05-13 LAB
BASOPHILS # BLD AUTO: 0.06 K/UL — SIGNIFICANT CHANGE UP (ref 0–0.2)
BASOPHILS NFR BLD AUTO: 0.7 % — SIGNIFICANT CHANGE UP (ref 0–2)
EOSINOPHIL # BLD AUTO: 0.36 K/UL — SIGNIFICANT CHANGE UP (ref 0–0.5)
EOSINOPHIL NFR BLD AUTO: 4.2 % — SIGNIFICANT CHANGE UP (ref 0–6)
HCT VFR BLD CALC: 31.6 % — LOW (ref 34.5–45)
HGB BLD-MCNC: 11.4 G/DL — LOW (ref 11.5–15.5)
IANC: 5.58 K/UL — SIGNIFICANT CHANGE UP (ref 1.8–7.4)
IMM GRANULOCYTES NFR BLD AUTO: 1.5 % — HIGH (ref 0–0.9)
LYMPHOCYTES # BLD AUTO: 1.73 K/UL — SIGNIFICANT CHANGE UP (ref 1–3.3)
LYMPHOCYTES # BLD AUTO: 20.3 % — SIGNIFICANT CHANGE UP (ref 13–44)
MCHC RBC-ENTMCNC: 32.3 PG — SIGNIFICANT CHANGE UP (ref 27–34)
MCHC RBC-ENTMCNC: 36.1 G/DL — HIGH (ref 32–36)
MCV RBC AUTO: 89.5 FL — SIGNIFICANT CHANGE UP (ref 80–100)
MONOCYTES # BLD AUTO: 0.67 K/UL — SIGNIFICANT CHANGE UP (ref 0–0.9)
MONOCYTES NFR BLD AUTO: 7.9 % — SIGNIFICANT CHANGE UP (ref 2–14)
NEUTROPHILS # BLD AUTO: 5.58 K/UL — SIGNIFICANT CHANGE UP (ref 1.8–7.4)
NEUTROPHILS NFR BLD AUTO: 65.4 % — SIGNIFICANT CHANGE UP (ref 43–77)
NRBC # BLD AUTO: 0 K/UL — SIGNIFICANT CHANGE UP (ref 0–0)
NRBC # FLD: 0 K/UL — SIGNIFICANT CHANGE UP (ref 0–0)
NRBC BLD AUTO-RTO: 0 /100 WBCS — SIGNIFICANT CHANGE UP (ref 0–0)
PLATELET # BLD AUTO: 155 K/UL — SIGNIFICANT CHANGE UP (ref 150–400)
RBC # BLD: 3.53 M/UL — LOW (ref 3.8–5.2)
RBC # FLD: 11.9 % — SIGNIFICANT CHANGE UP (ref 10.3–14.5)
WBC # BLD: 8.53 K/UL — SIGNIFICANT CHANGE UP (ref 3.8–10.5)
WBC # FLD AUTO: 8.53 K/UL — SIGNIFICANT CHANGE UP (ref 3.8–10.5)

## 2025-05-13 RX ORDER — SODIUM CHLORIDE 9 G/1000ML
1000 INJECTION, SOLUTION INTRAVENOUS
Refills: 0 | Status: DISCONTINUED | OUTPATIENT
Start: 2025-05-13 | End: 2025-05-14

## 2025-05-13 RX ORDER — OXYTOCIN-SODIUM CHLORIDE 0.9% IV SOLN 30 UNIT/500ML 30-0.9/5 UT/ML-%
SOLUTION INTRAVENOUS
Qty: 30 | Refills: 0 | Status: DISCONTINUED | OUTPATIENT
Start: 2025-05-13 | End: 2025-05-14

## 2025-05-13 RX ORDER — CITRIC ACID/SODIUM CITRATE 300-500 MG
15 SOLUTION, ORAL ORAL EVERY 6 HOURS
Refills: 0 | Status: DISCONTINUED | OUTPATIENT
Start: 2025-05-13 | End: 2025-05-14

## 2025-05-13 RX ORDER — OXYTOCIN-SODIUM CHLORIDE 0.9% IV SOLN 30 UNIT/500ML 30-0.9/5 UT/ML-%
167 SOLUTION INTRAVENOUS
Qty: 30 | Refills: 0 | Status: DISCONTINUED | OUTPATIENT
Start: 2025-05-13 | End: 2025-05-14

## 2025-05-13 RX ORDER — FLUOXETINE HYDROCHLORIDE 20 MG/1
20 CAPSULE ORAL DAILY
Refills: 0 | Status: DISCONTINUED | OUTPATIENT
Start: 2025-05-13 | End: 2025-05-16

## 2025-05-13 RX ORDER — ONDANSETRON HCL/PF 4 MG/2 ML
4 VIAL (ML) INJECTION ONCE
Refills: 0 | Status: COMPLETED | OUTPATIENT
Start: 2025-05-13 | End: 2025-05-13

## 2025-05-13 RX ADMIN — SODIUM CHLORIDE 125 MILLILITER(S): 9 INJECTION, SOLUTION INTRAVENOUS at 21:42

## 2025-05-13 RX ADMIN — OXYTOCIN-SODIUM CHLORIDE 0.9% IV SOLN 30 UNIT/500ML 2 MILLIUNIT(S)/MIN: 30-0.9/5 SOLUTION at 21:43

## 2025-05-13 RX ADMIN — SODIUM CHLORIDE 125 MILLILITER(S): 9 INJECTION, SOLUTION INTRAVENOUS at 18:28

## 2025-05-13 RX ADMIN — OXYTOCIN-SODIUM CHLORIDE 0.9% IV SOLN 30 UNIT/500ML 2 MILLIUNIT(S)/MIN: 30-0.9/5 SOLUTION at 18:54

## 2025-05-13 RX ADMIN — Medication 4 MILLIGRAM(S): at 21:42

## 2025-05-13 NOTE — OB PROVIDER H&P - HISTORY OF PRESENT ILLNESS
HPI: 31yo F  @37+3 presents with painful contractions. +FM. -LOF. -VB. Pt denies any other concerns.    PNC: Denies prenatal issues.   GBS neg  EFW g by sono.    OBHx:  FT , 5#14 c/b gHTN  GynHx: denies hx STIs, fibroids, polyps, cysts  PMH: denies hx clotting or bleeding disorders, HTN, DM  PSH: denies  PFH: no hx congenital disorders, bleeding/clotting disorders  Psych: denies   Social: denies etoh, smoking, drugs. Safe at home/in relationship.  Meds: PNV   Allergies: NKDA  Will accept blood transfusions? Yes HPI: 29yo F  @37+3 presents with irregular contractions. +FM. -LOF. -VB. Pt denies any other concerns.    PNC: Denies prenatal issues.   GBS neg  EFW approx 3175g by Leopold's    OBHx:  FT , 5#14 c/b gHTN  GynHx: denies hx STIs, fibroids, polyps, cysts  PMH: h/o WPW previously followed with cardiology with neg Holter monitor. Denies symptoms or f/u this pregnancy.   Asthma (no hospitalizations)  PSH: denies  PFH: no hx congenital disorders, bleeding/clotting disorders  Psych: denies   Social: denies etoh, smoking, drugs. Safe at home/in relationship.  Meds: PNV, Diclegis, Prozac 20mg   Allergies: NKDA  Will accept blood transfusions? Yes HPI: 29yo F  @37+3 presents with irregular contractions. +FM. -LOF. -VB. Pt denies any other concerns.    PNC: Denies prenatal issues.   GBS neg  EFW approx 3175g by Leopold's    OBHx:  FT , 5#14 c/b gHTN  GynHx: denies hx STIs, fibroids, polyps, cysts  PMH: h/o WPW previously followed with cardiology with neg Holter monitor. Denies symptoms or needing to f/u with cardiology this pregnancy.   Asthma (no hospitalizations)  PSH: denies  PFH: no hx congenital disorders, bleeding/clotting disorders  Psych: denies   Social: denies etoh, smoking, drugs. Safe at home/in relationship.  Meds: PNV, Diclegis, Prozac 20mg   Allergies: NKDA  Will accept blood transfusions? Yes

## 2025-05-13 NOTE — OB PROVIDER H&P - ATTENDING COMMENTS
Attending note   Agree with magdiel arias  admit in early labor  will augment with pitocin     MITRA BAKER

## 2025-05-13 NOTE — OB PROVIDER TRIAGE NOTE - NSHPPHYSICALEXAM_GEN_ALL_CORE
T(C): --  HR: --  BP: --  RR: --  SpO2: --    Gen: NAD  CV: RRR  Pulm: breathing comfortably on RA  Abd: gravid, nontender  Extr: moving all extremities with ease  – VE: 4.5/90/-2  – FHT Cat I: baseline 140, mod variability, +accels, -decels  – [place]: irregular  – Sono: vertex

## 2025-05-13 NOTE — OB PROVIDER H&P - NSPPHRISKSTATUS_OBGYN_ALL_OB
Low Risk Double Island Pedicle Flap Text: The defect edges were debeveled with a #15 scalpel blade.  Given the location of the defect, shape of the defect and the proximity to free margins a double island pedicle advancement flap was deemed most appropriate.  Using a sterile surgical marker, an appropriate advancement flap was drawn incorporating the defect, outlining the appropriate donor tissue and placing the expected incisions within the relaxed skin tension lines where possible.    The area thus outlined was incised deep to adipose tissue with a #15 scalpel blade.  The skin margins were undermined to an appropriate distance in all directions around the primary defect and laterally outward around the island pedicle utilizing iris scissors.  There was minimal undermining beneath the pedicle flap.

## 2025-05-13 NOTE — OB PROVIDER H&P - NSHPPHYSICALEXAM_GEN_ALL_CORE
T(C): --  HR: --  BP: --  RR: --  SpO2: --    Gen: NAD  CV: RRR  Pulm: breathing comfortably on RA  Abd: gravid, nontender  Extr: moving all extremities with ease  – VE: to be performed by Dr. Jorge Luis Solano  – [facility] Cat I: baseline 1, mod variability, +accels, -decels  – [place]: qmin  – Sono: vertex T(C): --  HR: --  BP: --  RR: --  SpO2: --    Gen: NAD  CV: RRR  Pulm: breathing comfortably on RA  Abd: gravid, nontender  Extr: moving all extremities with ease  – VE: 4.5/90/-2  – FHT Cat I: baseline 140, mod variability, +accels, -decels  – [place]: irregular  – Sono: vertex

## 2025-05-13 NOTE — OB PROVIDER H&P - ASSESSMENT
29yo F  @37+3 in labor    Plan  - Admit to L&D. Routine Labs. IVF.  - IOL w/  - Fetus: cat 1 tracing. VTX. EFW extrapolated to g by sono. Continuous EFM. No concerns.  - Prenatal issues: none  - GBS neg  - Pain: epidural PRN    Patient discussed with attending physician, Dr. Ponce.    JOSE ALJEANDRO Orellana, PGY4 31yo F  @37+3 in labor    Plan  - Admit to L&D. Routine Labs. IVF.  - Augment w/ pitocin  - Fetus: cat 1 tracing. VTX. EFW approx 3175g by Leopold's. Continuous EFM. No concerns.  - Prenatal issues: none  - GBS neg  - Pain: epidural PRN    Patient discussed with attending physician, Dr. Ponce.    JOSE ALEJANDRO Orellana, PGY4

## 2025-05-13 NOTE — OB RN PATIENT PROFILE - NS_PRENATALLABSOURCEGBS1PN_OBGYN_ALL_OB
unknown

## 2025-05-13 NOTE — OB PROVIDER TRIAGE NOTE - NSOBPROVIDERNOTE_OBGYN_ALL_OB_FT
31yo F  @37+3 in labor    Plan  - Admit to L&D. Routine Labs. IVF.  - Augment w/ pitocin  - Fetus: cat 1 tracing. VTX. EFW approx 3175g by Leopold's. Continuous EFM. No concerns.  - Prenatal issues: none  - GBS neg  - Pain: epidural PRN    Patient discussed with attending physician, Dr. Ponce.    JOSE ALEJANDRO Orellana, PGY4

## 2025-05-13 NOTE — OB RN PATIENT PROFILE - PRO HIV INFANT
Received request via: Pharmacy    Was the patient seen in the last year in this department? Yes    Does the patient have an active prescription (recently filled or refills available) for medication(s) requested? No      
negative

## 2025-05-13 NOTE — OB PROVIDER TRIAGE NOTE - HISTORY OF PRESENT ILLNESS
HPI: 31yo F  @37+3 presents with irregular contractions. +FM. -LOF. -VB. Pt denies any other concerns.    PNC: Denies prenatal issues.   GBS neg  EFW approx 3175g by Leopold's    OBHx:  FT , 5#14 c/b gHTN  GynHx: denies hx STIs, fibroids, polyps, cysts  PMH: h/o WPW previously followed with cardiology with neg Holter monitor. Denies symptoms or f/u this pregnancy.   Asthma (no hospitalizations)  PSH: denies  PFH: no hx congenital disorders, bleeding/clotting disorders  Psych: denies   Social: denies etoh, smoking, drugs. Safe at home/in relationship.  Meds: PNV, Diclegis, Prozac 20mg   Allergies: NKDA  Will accept blood transfusions? Yes

## 2025-05-13 NOTE — OB RN TRIAGE NOTE - PRETERM DELIVERIES, OB PROFILE
I contacted the patient in follow-up to our visit yesterday. She had concerns regarding bills she had received in December. I spoke with the [phone] Counselors and relayed their interpretation and suggestions to the patient.   An option for
0

## 2025-05-13 NOTE — CHART NOTE - NSCHARTNOTEFT_GEN_A_CORE
Attending Note     PT feeling some pressure     AFVSS  Gen in NAD   Abd soft, nontender   Ext: no c/c/e     SVE 4-5/90/-3, AROMed for clear fluid    mod yue no decels + accels   TOCO q 2-5 min     A/P: P1 at 37 + weeks in early labor   continue pitocin jasper Ponce MD

## 2025-05-14 PROBLEM — N12 TUBULO-INTERSTITIAL NEPHRITIS, NOT SPECIFIED AS ACUTE OR CHRONIC: Chronic | Status: ACTIVE | Noted: 2024-12-19

## 2025-05-14 LAB — T PALLIDUM AB TITR SER: NEGATIVE — SIGNIFICANT CHANGE UP

## 2025-05-14 RX ORDER — OXYCODONE HYDROCHLORIDE 30 MG/1
5 TABLET ORAL ONCE
Refills: 0 | Status: DISCONTINUED | OUTPATIENT
Start: 2025-05-14 | End: 2025-05-16

## 2025-05-14 RX ORDER — HYDROCORTISONE 10 MG/G
1 CREAM TOPICAL EVERY 6 HOURS
Refills: 0 | Status: DISCONTINUED | OUTPATIENT
Start: 2025-05-14 | End: 2025-05-16

## 2025-05-14 RX ORDER — BENZOCAINE 220 MG/G
1 SPRAY, METERED PERIODONTAL EVERY 6 HOURS
Refills: 0 | Status: DISCONTINUED | OUTPATIENT
Start: 2025-05-14 | End: 2025-05-16

## 2025-05-14 RX ORDER — MODIFIED LANOLIN 100 %
1 CREAM (GRAM) TOPICAL EVERY 6 HOURS
Refills: 0 | Status: DISCONTINUED | OUTPATIENT
Start: 2025-05-14 | End: 2025-05-16

## 2025-05-14 RX ORDER — ALBUTEROL SULFATE 2.5 MG/3ML
2 VIAL, NEBULIZER (ML) INHALATION EVERY 6 HOURS
Refills: 0 | Status: DISCONTINUED | OUTPATIENT
Start: 2025-05-14 | End: 2025-05-16

## 2025-05-14 RX ORDER — PRENATAL 136/IRON/FOLIC ACID 27 MG-1 MG
1 TABLET ORAL DAILY
Refills: 0 | Status: DISCONTINUED | OUTPATIENT
Start: 2025-05-14 | End: 2025-05-16

## 2025-05-14 RX ORDER — OXYTOCIN-SODIUM CHLORIDE 0.9% IV SOLN 30 UNIT/500ML 30-0.9/5 UT/ML-%
167 SOLUTION INTRAVENOUS
Qty: 30 | Refills: 0 | Status: DISCONTINUED | OUTPATIENT
Start: 2025-05-14 | End: 2025-05-14

## 2025-05-14 RX ORDER — IBUPROFEN 200 MG
600 TABLET ORAL EVERY 6 HOURS
Refills: 0 | Status: COMPLETED | OUTPATIENT
Start: 2025-05-14 | End: 2026-04-12

## 2025-05-14 RX ORDER — WITCH HAZEL LEAF
1 FLUID EXTRACT MISCELLANEOUS EVERY 4 HOURS
Refills: 0 | Status: DISCONTINUED | OUTPATIENT
Start: 2025-05-14 | End: 2025-05-16

## 2025-05-14 RX ORDER — MAGNESIUM HYDROXIDE 400 MG/5ML
30 SUSPENSION ORAL
Refills: 0 | Status: DISCONTINUED | OUTPATIENT
Start: 2025-05-14 | End: 2025-05-16

## 2025-05-14 RX ORDER — DIBUCAINE 10 MG/G
1 OINTMENT TOPICAL EVERY 6 HOURS
Refills: 0 | Status: DISCONTINUED | OUTPATIENT
Start: 2025-05-14 | End: 2025-05-16

## 2025-05-14 RX ORDER — OXYCODONE HYDROCHLORIDE 30 MG/1
5 TABLET ORAL
Refills: 0 | Status: DISCONTINUED | OUTPATIENT
Start: 2025-05-14 | End: 2025-05-16

## 2025-05-14 RX ORDER — IBUPROFEN 200 MG
600 TABLET ORAL EVERY 6 HOURS
Refills: 0 | Status: DISCONTINUED | OUTPATIENT
Start: 2025-05-14 | End: 2025-05-16

## 2025-05-14 RX ORDER — ACETAMINOPHEN 500 MG/5ML
975 LIQUID (ML) ORAL
Refills: 0 | Status: DISCONTINUED | OUTPATIENT
Start: 2025-05-14 | End: 2025-05-16

## 2025-05-14 RX ORDER — PRAMOXINE HCL 1 %
1 GEL (GRAM) TOPICAL EVERY 4 HOURS
Refills: 0 | Status: DISCONTINUED | OUTPATIENT
Start: 2025-05-14 | End: 2025-05-16

## 2025-05-14 RX ORDER — KETOROLAC TROMETHAMINE 30 MG/ML
30 INJECTION, SOLUTION INTRAMUSCULAR; INTRAVENOUS ONCE
Refills: 0 | Status: DISCONTINUED | OUTPATIENT
Start: 2025-05-14 | End: 2025-05-14

## 2025-05-14 RX ORDER — SIMETHICONE 80 MG
80 TABLET,CHEWABLE ORAL EVERY 4 HOURS
Refills: 0 | Status: DISCONTINUED | OUTPATIENT
Start: 2025-05-14 | End: 2025-05-16

## 2025-05-14 RX ORDER — CLOSTRIDIUM TETANI TOXOID ANTIGEN (FORMALDEHYDE INACTIVATED), CORYNEBACTERIUM DIPHTHERIAE TOXOID ANTIGEN (FORMALDEHYDE INACTIVATED), BORDETELLA PERTUSSIS TOXOID ANTIGEN (GLUTARALDEHYDE INACTIVATED), BORDETELLA PERTUSSIS FILAMENTOUS HEMAGGLUTININ ANTIGEN (FORMALDEHYDE INACTIVATED), BORDETELLA PERTUSSIS PERTACTIN ANTIGEN, AND BORDETELLA PERTUSSIS FIMBRIAE 2/3 ANTIGEN 5; 2; 2.5; 5; 3; 5 [LF]/.5ML; [LF]/.5ML; UG/.5ML; UG/.5ML; UG/.5ML; UG/.5ML
0.5 INJECTION, SUSPENSION INTRAMUSCULAR ONCE
Refills: 0 | Status: DISCONTINUED | OUTPATIENT
Start: 2025-05-14 | End: 2025-05-16

## 2025-05-14 RX ORDER — DIPHENHYDRAMINE HCL 12.5MG/5ML
25 ELIXIR ORAL EVERY 6 HOURS
Refills: 0 | Status: DISCONTINUED | OUTPATIENT
Start: 2025-05-14 | End: 2025-05-16

## 2025-05-14 RX ADMIN — FLUOXETINE HYDROCHLORIDE 20 MILLIGRAM(S): 20 CAPSULE ORAL at 18:57

## 2025-05-14 RX ADMIN — Medication 3 MILLILITER(S): at 14:00

## 2025-05-14 RX ADMIN — Medication 600 MILLIGRAM(S): at 13:00

## 2025-05-14 RX ADMIN — Medication 975 MILLIGRAM(S): at 16:21

## 2025-05-14 RX ADMIN — Medication 600 MILLIGRAM(S): at 12:19

## 2025-05-14 RX ADMIN — Medication 600 MILLIGRAM(S): at 23:51

## 2025-05-14 RX ADMIN — Medication 975 MILLIGRAM(S): at 21:25

## 2025-05-14 RX ADMIN — Medication 600 MILLIGRAM(S): at 18:48

## 2025-05-14 RX ADMIN — Medication 975 MILLIGRAM(S): at 17:00

## 2025-05-14 RX ADMIN — Medication 975 MILLIGRAM(S): at 20:55

## 2025-05-14 NOTE — OB RN DELIVERY SUMMARY - NSSELHIDDEN_OBGYN_ALL_OB_FT
[NS_DeliveryAttending1_OBGYN_ALL_OB_FT:[id number]=],[NS_DeliveryRN_OBGYN_ALL_OB_FT:[id number]]

## 2025-05-14 NOTE — OB PROVIDER DELIVERY SUMMARY - NSSELHIDDEN_OBGYN_ALL_OB_FT
[NS_DeliveryAttending1_OBGYN_ALL_OB_FT:[email or web address]=],[NS_DeliveryRN_OBGYN_ALL_OB_FT:[id number]=]

## 2025-05-14 NOTE — OB PROVIDER DELIVERY SUMMARY - NSPROVIDERDELIVERYNOTE_OBGYN_ALL_OB_FT
Attending Note    Pt progressed to 10/100/+2  Delivered vertex over intact perineum   Shoulders and body delivered over intact perineum   Delayed cord clamping   Placenta delivered intact  Uterine cavity empty  Vagina, rectum and cervix inspected  no lacerations noted  rectal exam intact    R Rosario CHRISTIANSEN

## 2025-05-14 NOTE — OB RN DELIVERY SUMMARY - NS_SEPSISRSKCALC_OBGYN_ALL_OB_FT
EOS calculated successfully. EOS Risk Factor: 0.5/1000 live births ([facility] national incidence); GA=37w4d; Temp=99.14; ROM=8.25; GBS='Negative'; Antibiotics='No antibiotics or any antibiotics < 2 hrs prior to birth'

## 2025-05-15 RX ADMIN — Medication 600 MILLIGRAM(S): at 12:33

## 2025-05-15 RX ADMIN — Medication 975 MILLIGRAM(S): at 08:41

## 2025-05-15 RX ADMIN — Medication 600 MILLIGRAM(S): at 18:30

## 2025-05-15 RX ADMIN — Medication 975 MILLIGRAM(S): at 02:59

## 2025-05-15 RX ADMIN — Medication 600 MILLIGRAM(S): at 23:37

## 2025-05-15 RX ADMIN — Medication 600 MILLIGRAM(S): at 17:59

## 2025-05-15 RX ADMIN — Medication 600 MILLIGRAM(S): at 05:52

## 2025-05-15 RX ADMIN — Medication 975 MILLIGRAM(S): at 21:52

## 2025-05-15 RX ADMIN — Medication 975 MILLIGRAM(S): at 14:34

## 2025-05-15 RX ADMIN — FLUOXETINE HYDROCHLORIDE 20 MILLIGRAM(S): 20 CAPSULE ORAL at 17:59

## 2025-05-15 RX ADMIN — Medication 600 MILLIGRAM(S): at 13:00

## 2025-05-15 RX ADMIN — Medication 975 MILLIGRAM(S): at 03:29

## 2025-05-15 RX ADMIN — Medication 1 TABLET(S): at 12:33

## 2025-05-15 RX ADMIN — Medication 600 MILLIGRAM(S): at 00:21

## 2025-05-15 RX ADMIN — Medication 600 MILLIGRAM(S): at 06:22

## 2025-05-15 RX ADMIN — Medication 975 MILLIGRAM(S): at 15:10

## 2025-05-15 RX ADMIN — Medication 975 MILLIGRAM(S): at 21:22

## 2025-05-15 RX ADMIN — Medication 975 MILLIGRAM(S): at 09:15

## 2025-05-15 NOTE — PROGRESS NOTE ADULT - SUBJECTIVE AND OBJECTIVE BOX
S: Patient doing well. Minimal lochia. Pain controlled.    VSS stable - afebrile   Gen: NAD  Abd: soft, NT, ND, fundus firm below umbilicus  Lochia: moderate  Ext: no tenderness    Labs:      A: 30y PPD#1 s/p  doing well.    Plan: continue pp care   discharge in am   C Kailash

## 2025-05-16 ENCOUNTER — TRANSCRIPTION ENCOUNTER (OUTPATIENT)
Age: 30
End: 2025-05-16

## 2025-05-16 VITALS
SYSTOLIC BLOOD PRESSURE: 127 MMHG | OXYGEN SATURATION: 100 % | RESPIRATION RATE: 17 BRPM | TEMPERATURE: 98 F | HEART RATE: 80 BPM | DIASTOLIC BLOOD PRESSURE: 69 MMHG

## 2025-05-16 RX ORDER — FLUOXETINE HYDROCHLORIDE 20 MG/1
1 CAPSULE ORAL
Qty: 0 | Refills: 0 | DISCHARGE
Start: 2025-05-16

## 2025-05-16 RX ORDER — ACETAMINOPHEN 500 MG/5ML
2 LIQUID (ML) ORAL
Qty: 0 | Refills: 0 | DISCHARGE
Start: 2025-05-16

## 2025-05-16 RX ORDER — IBUPROFEN 200 MG
1 TABLET ORAL
Qty: 0 | Refills: 0 | DISCHARGE
Start: 2025-05-16

## 2025-05-16 RX ADMIN — Medication 975 MILLIGRAM(S): at 10:15

## 2025-05-16 RX ADMIN — Medication 600 MILLIGRAM(S): at 05:51

## 2025-05-16 RX ADMIN — Medication 600 MILLIGRAM(S): at 00:07

## 2025-05-16 RX ADMIN — Medication 600 MILLIGRAM(S): at 06:21

## 2025-05-16 RX ADMIN — Medication 975 MILLIGRAM(S): at 09:16

## 2025-05-16 NOTE — DISCHARGE NOTE OB - NSDCCONDITION_GEN_ALL_CORE
Spoke with pt's father, this morning, this is addressed in another encounter, and he agrees with provider recommendation and will pt in for evaluation if needed after she returns home on 07/04/2019. Stable

## 2025-05-16 NOTE — DISCHARGE NOTE OB - PATIENT PORTAL LINK FT
You can access the FollowMyHealth Patient Portal offered by [facility] by registering at the following website: [email or web address]. By joining [facility]’s FollowMyHealth portal, you will also be able to view your health information using other applications (apps) compatible with our system.

## 2025-05-16 NOTE — DISCHARGE NOTE OB - REASON FOR REFUSAL
Doris Lopez [id number] Patient Status:  Inpatient   Age/Gender 71year old female MRN [id number]   Location [address] [facility] Attending Nils Sparks., Mat Lunsford MD   Hosp Day # 0 PCP Munira Murray MD       Anesthesia Pos Pt preference

## 2025-05-16 NOTE — DISCHARGE NOTE OB - MATERIALS PROVIDED
[facility] [place] Screening Program/[place]  Immunization Record/Breastfeeding Log/Breastfeeding Mother’s Support Group Information/Guide to Postpartum Care/[facility] Hearing Screen Program/Back To Sleep Handout/Shaken Baby Prevention Handout/Breastfeeding Guide and Packet/Birth Certificate Instructions/Discharge Medication Information for Patients and Families Pocket Guide

## 2025-05-16 NOTE — DISCHARGE NOTE OB - FINANCIAL ASSISTANCE
[facility] provides services at a reduced cost to those who are determined to be eligible through [facility]’s financial assistance program. Information regarding [facility]’s financial assistance program can be found by going to [email or web address] or by calling [phone].

## 2025-05-16 NOTE — DISCHARGE NOTE OB - NS MD DC FALL RISK RISK
For information on Fall & Injury Prevention, visit: [email or web address] OR  [email or web address] OR  https://www.cdc.gov/steadi/patient.html

## 2025-05-16 NOTE — DISCHARGE NOTE OB - HOSPITAL COURSE
29yo F  @37+3 in labor- delivered  Girl , intact perineum   relevant hx WPW no need fo r follow up with cardiology   postpartum course uncomplicated

## 2025-05-16 NOTE — DISCHARGE NOTE OB - REST! DO NOT DO HEAVY HOUSEWORK, LIFTING OR STRENOUS EXERCISE FOR TWO WEEKS
S/P: Bilateral lumbar L3, L4, L5 medial branch block     DOS: 04/01/2025    Pain  before procedure with activity : 3    Pain after procedure with activity: 0/1    Activities following procedure include: sitting     % of pain relief: 90%    Procedure successful: Yes    OV or OR scheduled: OR       Statement Selected

## 2025-05-16 NOTE — DISCHARGE NOTE OB - CARE PLAN
Principal Discharge DX:	Normal vaginal delivery  Assessment and plan of treatment:	labor -- -- postpartum recovery   1

## 2025-05-16 NOTE — DISCHARGE NOTE OB - CARE PROVIDER_API CALL
Rachel Ponce  Obstetrics and Gynecology  [address] [facility] [address], [address]  [place] [address]  Phone: [phone]  Fax: [phone]  Follow Up Time:
